# Patient Record
Sex: MALE | Race: WHITE | NOT HISPANIC OR LATINO | Employment: OTHER | URBAN - METROPOLITAN AREA
[De-identification: names, ages, dates, MRNs, and addresses within clinical notes are randomized per-mention and may not be internally consistent; named-entity substitution may affect disease eponyms.]

---

## 2023-06-27 ENCOUNTER — HOSPITAL ENCOUNTER (OUTPATIENT)
Dept: DATA CONVERSION | Facility: HOSPITAL | Age: 53
End: 2023-06-27
Attending: ANESTHESIOLOGY | Admitting: ANESTHESIOLOGY
Payer: COMMERCIAL

## 2023-06-27 DIAGNOSIS — G90.522 COMPLEX REGIONAL PAIN SYNDROME I OF LEFT LOWER LIMB: ICD-10-CM

## 2023-06-27 DIAGNOSIS — M54.16 RADICULOPATHY, LUMBAR REGION: ICD-10-CM

## 2023-10-02 NOTE — OP NOTE
Post Operative Note:     PreOp Diagnosis: CRPS type one of the left lower  extremity   Post-Procedure Diagnosis: CRPS type one of the left  lower extremity   Procedure: 1. LUMBAR SYMPATHETIC NERVE BLOCK     2.  Sedation with midazolam   Surgeon: Marley White MD PHD   Resident/Fellow/Other Assistant: Rahul Vu MD   Estimated Blood Loss (mL): none   Specimen: no   Findings: none     Operative Report Dictated:  Dictation: not applicable - note contains Operative  Report   Operative Report:    53-year-old male with a past medical history of left leg injury in 2014 resulting in CRPS type I.    After informed consent was obtained, the patient was brought to the procedure room and placed in the prone position.  The low back area was prepped and draped in the usual sterile fashion.  Using fluoroscopic guidance, the skin and subcutaneous tissue  overlying needle trajectories were anesthetized with 0.5% ropivacaine.  A 22-gauge Chiba needle was then introduced in an oblique approach until it was flush with the anterior aspect of the vertebral body on the lateral view at L3.  Injection of contrast  revealed appropriate spread.  This was followed by injection of 15 mL of 0.5% ropivacaine in divided doses.  The patient was then transferred to the recovery room in a stable condition.  The patient tolerated procedure well.  There were no apparent complications.     FOLLOW UP:  Postoperatively the patient's skin temperature was recorded.  The left lower extremity was 2 degrees warmer than the right.  Patient notes that he is already experiencing pain relief.  Preoperative pain score was 8 out of 10.  Currently his pain score  6 out of 10.The patient will update us on their response to this procedure, and agrees to continue currently prescribed/recommended therapies.    Attestation:   Note Completion:  I am a: Resident/Fellow   Attending Attestation I was present for the entire procedure         Electronic  Signatures:  Rahul Vu (Fellow))  (Signed 27-Jun-2023 08:43)   Authored: Post Operative Note, Note Completion  Marley White)  (Signed 27-Jun-2023 10:04)   Co-Signer: Post Operative Note, Note Completion      Last Updated: 27-Jun-2023 10:04 by Marley White)

## 2023-10-03 ENCOUNTER — TELEPHONE (OUTPATIENT)
Dept: PAIN MEDICINE | Facility: CLINIC | Age: 53
End: 2023-10-03
Payer: COMMERCIAL

## 2023-10-03 DIAGNOSIS — G90.529 COMPLEX REGIONAL PAIN SYNDROME TYPE 1 OF LOWER EXTREMITY, UNSPECIFIED LATERALITY: Primary | ICD-10-CM

## 2023-10-03 RX ORDER — PREGABALIN 200 MG/1
200 CAPSULE ORAL 2 TIMES DAILY
Qty: 60 CAPSULE | Refills: 1 | Status: SHIPPED | OUTPATIENT
Start: 2023-10-03 | End: 2023-11-13 | Stop reason: ALTCHOICE

## 2023-10-03 NOTE — TELEPHONE ENCOUNTER
Pt requesting pregabalin increase to 200mg. Stated that the 150's aren't as strong anymore. Refill to [facility]. Lov 07/25/23, no fuv. Current csa, needs uds

## 2023-10-17 PROBLEM — G90.522 COMPLEX REGIONAL PAIN SYNDROME TYPE 1 OF LEFT LOWER EXTREMITY: Status: ACTIVE | Noted: 2023-10-17

## 2023-10-17 RX ORDER — ONDANSETRON HYDROCHLORIDE 2 MG/ML
4 INJECTION, SOLUTION INTRAVENOUS ONCE
Status: CANCELLED | OUTPATIENT
Start: 2023-10-18 | End: 2023-10-18

## 2023-10-17 RX ORDER — NITROGLYCERIN 0.4 MG/1
0.4 TABLET SUBLINGUAL ONCE
Status: CANCELLED | OUTPATIENT
Start: 2023-10-18 | End: 2023-10-18

## 2023-10-17 RX ORDER — DIPHENHYDRAMINE HYDROCHLORIDE 50 MG/ML
50 INJECTION INTRAMUSCULAR; INTRAVENOUS AS NEEDED
Status: CANCELLED | OUTPATIENT
Start: 2023-10-18

## 2023-10-17 RX ORDER — LIDOCAINE 50 MG/G
OINTMENT TOPICAL 2 TIMES DAILY
COMMUNITY
Start: 2022-09-27 | End: 2024-01-08 | Stop reason: SINTOL

## 2023-10-17 RX ORDER — GABAPENTIN 300 MG/1
300 CAPSULE ORAL
COMMUNITY
Start: 2022-10-07

## 2023-10-17 RX ORDER — EPINEPHRINE 0.3 MG/.3ML
0.3 INJECTION SUBCUTANEOUS EVERY 5 MIN PRN
Status: CANCELLED | OUTPATIENT
Start: 2023-10-18

## 2023-10-17 RX ORDER — KETOROLAC TROMETHAMINE 30 MG/ML
30 INJECTION, SOLUTION INTRAMUSCULAR; INTRAVENOUS ONCE
Status: CANCELLED | OUTPATIENT
Start: 2023-10-18 | End: 2023-10-18

## 2023-10-17 RX ORDER — METOPROLOL TARTRATE 25 MG/1
25 TABLET, FILM COATED ORAL ONCE
Status: CANCELLED | OUTPATIENT
Start: 2023-10-18 | End: 2023-10-18

## 2023-10-17 RX ORDER — PREGABALIN 150 MG/1
150 CAPSULE ORAL 2 TIMES DAILY
COMMUNITY
Start: 2023-09-05 | End: 2023-10-18 | Stop reason: SDUPTHER

## 2023-10-17 RX ORDER — FAMOTIDINE 10 MG/ML
20 INJECTION INTRAVENOUS ONCE AS NEEDED
Status: CANCELLED | OUTPATIENT
Start: 2023-10-18

## 2023-10-17 RX ORDER — KETAMINE HCL IN NACL, ISO-OSM 1000MG/100
PLASTIC BAG, INJECTION (ML) INTRAVENOUS
Status: CANCELLED | OUTPATIENT
Start: 2023-10-17

## 2023-10-17 RX ORDER — KETAMINE HCL IN NACL, ISO-OSM 100MG/10ML
SYRINGE (ML) INJECTION
Status: CANCELLED | OUTPATIENT
Start: 2023-10-17

## 2023-10-17 RX ORDER — ALBUTEROL SULFATE 0.83 MG/ML
3 SOLUTION RESPIRATORY (INHALATION) AS NEEDED
Status: CANCELLED | OUTPATIENT
Start: 2023-10-18

## 2023-10-18 ENCOUNTER — INFUSION (OUTPATIENT)
Dept: INFUSION THERAPY | Facility: CLINIC | Age: 53
End: 2023-10-18
Payer: COMMERCIAL

## 2023-10-18 VITALS
HEART RATE: 61 BPM | RESPIRATION RATE: 14 BRPM | TEMPERATURE: 97 F | OXYGEN SATURATION: 95 % | SYSTOLIC BLOOD PRESSURE: 103 MMHG | DIASTOLIC BLOOD PRESSURE: 65 MMHG

## 2023-10-18 DIAGNOSIS — G90.522 COMPLEX REGIONAL PAIN SYNDROME TYPE 1 OF LEFT LOWER EXTREMITY: ICD-10-CM

## 2023-10-18 PROCEDURE — 96375 TX/PRO/DX INJ NEW DRUG ADDON: CPT

## 2023-10-18 PROCEDURE — 96368 THER/DIAG CONCURRENT INF: CPT

## 2023-10-18 PROCEDURE — 96365 THER/PROPH/DIAG IV INF INIT: CPT

## 2023-10-18 PROCEDURE — 2500000005 HC RX 250 GENERAL PHARMACY W/O HCPCS: Performed by: NURSE PRACTITIONER

## 2023-10-18 PROCEDURE — 2500000004 HC RX 250 GENERAL PHARMACY W/ HCPCS (ALT 636 FOR OP/ED): Performed by: NURSE PRACTITIONER

## 2023-10-18 PROCEDURE — 2500000004 HC RX 250 GENERAL PHARMACY W/ HCPCS (ALT 636 FOR OP/ED)

## 2023-10-18 RX ORDER — METOPROLOL TARTRATE 25 MG/1
25 TABLET, FILM COATED ORAL ONCE
Status: CANCELLED | OUTPATIENT
Start: 2023-11-01 | End: 2023-11-01

## 2023-10-18 RX ORDER — NITROGLYCERIN 0.4 MG/1
0.4 TABLET SUBLINGUAL ONCE
Status: CANCELLED | OUTPATIENT
Start: 2023-11-01 | End: 2023-11-01

## 2023-10-18 RX ORDER — KETOROLAC TROMETHAMINE 30 MG/ML
30 INJECTION, SOLUTION INTRAMUSCULAR; INTRAVENOUS ONCE
Status: COMPLETED | OUTPATIENT
Start: 2023-10-18 | End: 2023-10-18

## 2023-10-18 RX ORDER — ALBUTEROL SULFATE 0.83 MG/ML
3 SOLUTION RESPIRATORY (INHALATION) AS NEEDED
Status: CANCELLED | OUTPATIENT
Start: 2023-11-01

## 2023-10-18 RX ORDER — EPINEPHRINE 0.3 MG/.3ML
0.3 INJECTION SUBCUTANEOUS EVERY 5 MIN PRN
Status: CANCELLED | OUTPATIENT
Start: 2023-11-01

## 2023-10-18 RX ORDER — ONDANSETRON HYDROCHLORIDE 2 MG/ML
4 INJECTION, SOLUTION INTRAVENOUS ONCE
Status: CANCELLED | OUTPATIENT
Start: 2023-11-01 | End: 2023-11-01

## 2023-10-18 RX ORDER — DIPHENHYDRAMINE HYDROCHLORIDE 50 MG/ML
50 INJECTION INTRAMUSCULAR; INTRAVENOUS AS NEEDED
Status: CANCELLED | OUTPATIENT
Start: 2023-11-01

## 2023-10-18 RX ORDER — FAMOTIDINE 10 MG/ML
20 INJECTION INTRAVENOUS ONCE AS NEEDED
Status: CANCELLED | OUTPATIENT
Start: 2023-11-01

## 2023-10-18 RX ORDER — KETOROLAC TROMETHAMINE 30 MG/ML
INJECTION, SOLUTION INTRAMUSCULAR; INTRAVENOUS
Status: COMPLETED
Start: 2023-10-18 | End: 2023-10-18

## 2023-10-18 RX ORDER — KETOROLAC TROMETHAMINE 30 MG/ML
30 INJECTION, SOLUTION INTRAMUSCULAR; INTRAVENOUS ONCE
Status: CANCELLED | OUTPATIENT
Start: 2023-11-01 | End: 2023-11-01

## 2023-10-18 RX ADMIN — PROPOFOL 100 MG: 10 INJECTION, EMULSION INTRAVENOUS at 09:15

## 2023-10-18 RX ADMIN — KETOROLAC TROMETHAMINE 30 MG: 30 INJECTION INTRAMUSCULAR; INTRAVENOUS at 09:16

## 2023-10-18 RX ADMIN — KETOROLAC TROMETHAMINE 30 MG: 30 INJECTION, SOLUTION INTRAMUSCULAR; INTRAVENOUS at 09:16

## 2023-10-18 RX ADMIN — Medication: at 09:13

## 2023-10-18 ASSESSMENT — PATIENT HEALTH QUESTIONNAIRE - PHQ9
2. FEELING DOWN, DEPRESSED OR HOPELESS: NOT AT ALL
SUM OF ALL RESPONSES TO PHQ9 QUESTIONS 1 AND 2: 0
1. LITTLE INTEREST OR PLEASURE IN DOING THINGS: NOT AT ALL

## 2023-10-18 ASSESSMENT — ENCOUNTER SYMPTOMS
OCCASIONAL FEELINGS OF UNSTEADINESS: 1
DEPRESSION: 0
LOSS OF SENSATION IN FEET: 0

## 2023-10-18 ASSESSMENT — PAIN SCALES - GENERAL
PAINLEVEL_OUTOF10: 6
PAINLEVEL_OUTOF10: 9

## 2023-10-18 ASSESSMENT — COLUMBIA-SUICIDE SEVERITY RATING SCALE - C-SSRS
6. HAVE YOU EVER DONE ANYTHING, STARTED TO DO ANYTHING, OR PREPARED TO DO ANYTHING TO END YOUR LIFE?: NO
1. IN THE PAST MONTH, HAVE YOU WISHED YOU WERE DEAD OR WISHED YOU COULD GO TO SLEEP AND NOT WAKE UP?: NO
2. HAVE YOU ACTUALLY HAD ANY THOUGHTS OF KILLING YOURSELF?: NO

## 2023-10-18 ASSESSMENT — PAIN - FUNCTIONAL ASSESSMENT: PAIN_FUNCTIONAL_ASSESSMENT: 0-10

## 2023-10-18 ASSESSMENT — PAIN DESCRIPTION - DESCRIPTORS: DESCRIPTORS: ACHING

## 2023-10-18 NOTE — PATIENT INSTRUCTIONS
Post infusion teaching provided via handout and verbal instruction. Patient verbalized understanding. VSS, Patient states pain is less. Will assist patient to waiting car via wheelchair.

## 2023-10-18 NOTE — PROGRESS NOTES
S: Patient here for 4th opioid sparing pain infusion. Patient reports zero% reduction in pain after last infusion.    Purpose of pain infusion meds explained along with potential side effects.  Patient verbalized understanding.    B: Pain Issues    A: Patient currently has pain described on flow sheet documentation. Designated  is brother Fadi. Patient last ate solid food >8 hours ago, and had liquid >4 hours ago.    R: Plan; Obtain IV access, do patient risk assessment, and start opioid sparing infusion as ordered. Monitoring for S/S of adverse reactions.    0950: pain infusion completed, patient tolerated well. Patient now rates pain as a 6/10 in lower L leg. Continuing to monitor.

## 2023-10-26 ENCOUNTER — APPOINTMENT (OUTPATIENT)
Dept: PAIN MEDICINE | Facility: CLINIC | Age: 53
End: 2023-10-26
Payer: COMMERCIAL

## 2023-11-01 ENCOUNTER — APPOINTMENT (OUTPATIENT)
Dept: INFUSION THERAPY | Facility: CLINIC | Age: 53
End: 2023-11-01
Payer: COMMERCIAL

## 2023-11-09 ENCOUNTER — APPOINTMENT (OUTPATIENT)
Dept: PAIN MEDICINE | Facility: CLINIC | Age: 53
End: 2023-11-09
Payer: COMMERCIAL

## 2023-11-13 ENCOUNTER — OFFICE VISIT (OUTPATIENT)
Dept: PAIN MEDICINE | Facility: CLINIC | Age: 53
End: 2023-11-13
Payer: COMMERCIAL

## 2023-11-13 VITALS
TEMPERATURE: 97.2 F | RESPIRATION RATE: 16 BRPM | DIASTOLIC BLOOD PRESSURE: 65 MMHG | HEART RATE: 74 BPM | HEIGHT: 77 IN | WEIGHT: 260 LBS | OXYGEN SATURATION: 97 % | SYSTOLIC BLOOD PRESSURE: 93 MMHG | BODY MASS INDEX: 30.7 KG/M2

## 2023-11-13 DIAGNOSIS — G90.522 COMPLEX REGIONAL PAIN SYNDROME TYPE 1 OF LEFT LOWER EXTREMITY: Primary | ICD-10-CM

## 2023-11-13 PROCEDURE — 99213 OFFICE O/P EST LOW 20 MIN: CPT | Performed by: ANESTHESIOLOGY

## 2023-11-13 RX ORDER — PREGABALIN 225 MG/1
225 CAPSULE ORAL 2 TIMES DAILY
Qty: 60 CAPSULE | Refills: 0 | Status: SHIPPED | OUTPATIENT
Start: 2023-11-13 | End: 2023-12-28

## 2023-11-13 SDOH — ECONOMIC STABILITY: HOUSING INSECURITY
IN THE LAST 12 MONTHS, WAS THERE A TIME WHEN YOU DID NOT HAVE A STEADY PLACE TO SLEEP OR SLEPT IN A SHELTER (INCLUDING NOW)?: NO

## 2023-11-13 SDOH — ECONOMIC STABILITY: FOOD INSECURITY: WITHIN THE PAST 12 MONTHS, YOU WORRIED THAT YOUR FOOD WOULD RUN OUT BEFORE YOU GOT MONEY TO BUY MORE.: NEVER TRUE

## 2023-11-13 SDOH — ECONOMIC STABILITY: INCOME INSECURITY: IN THE LAST 12 MONTHS, WAS THERE A TIME WHEN YOU WERE NOT ABLE TO PAY THE MORTGAGE OR RENT ON TIME?: NO

## 2023-11-13 SDOH — ECONOMIC STABILITY: FOOD INSECURITY: WITHIN THE PAST 12 MONTHS, THE FOOD YOU BOUGHT JUST DIDN'T LAST AND YOU DIDN'T HAVE MONEY TO GET MORE.: NEVER TRUE

## 2023-11-13 ASSESSMENT — COLUMBIA-SUICIDE SEVERITY RATING SCALE - C-SSRS
1. IN THE PAST MONTH, HAVE YOU WISHED YOU WERE DEAD OR WISHED YOU COULD GO TO SLEEP AND NOT WAKE UP?: NO
2. HAVE YOU ACTUALLY HAD ANY THOUGHTS OF KILLING YOURSELF?: NO
6. HAVE YOU EVER DONE ANYTHING, STARTED TO DO ANYTHING, OR PREPARED TO DO ANYTHING TO END YOUR LIFE?: NO

## 2023-11-13 ASSESSMENT — PAIN SCALES - GENERAL: PAINLEVEL: 9

## 2023-11-13 ASSESSMENT — PATIENT HEALTH QUESTIONNAIRE - PHQ9
SUM OF ALL RESPONSES TO PHQ9 QUESTIONS 1 AND 2: 0
2. FEELING DOWN, DEPRESSED OR HOPELESS: NOT AT ALL
1. LITTLE INTEREST OR PLEASURE IN DOING THINGS: NOT AT ALL

## 2023-11-13 ASSESSMENT — ENCOUNTER SYMPTOMS
FEVER: 0
SHORTNESS OF BREATH: 0

## 2023-11-13 ASSESSMENT — LIFESTYLE VARIABLES: TOTAL SCORE: 0

## 2023-11-13 NOTE — PROGRESS NOTES
"Chief Complain  Follow-up (CRPS follow up, 9/10 pain level, had 4 infusion gave about 20% relief for a couple hours)       History Of Present Illness  Harry Green is a 53 y.o. male here for left lower extremity pain . The patient rates the pain at 9  on a scale from 0-10.  The patient describes pain as sharp, burning and needles.  The pain is worsened by  putting weight on the left, sleeping  and is alleviated by  elevating the leg .  Since the last visit the pain has stayed the same.  The patient denies any fever, chills, weight loss, bladder/bowel incontinence.       Past Medical History  He has a past medical history of Chronic pain disorder.    Surgical History  He has a past surgical history that includes Hernia repair and Leg Surgery.     Social History  He reports that he has never smoked. He has never used smokeless tobacco. He reports that he does not drink alcohol and does not use drugs.    Family History  Family History   Problem Relation Name Age of Onset    Lymphoma Mother      Cancer Other      Diabetes Other          Allergies  Lidocaine    Review of Systems  Review of Systems   Constitutional:  Negative for fever.   Respiratory:  Negative for shortness of breath.    Cardiovascular:  Negative for chest pain.   Psychiatric/Behavioral:  Negative for suicidal ideas.         Physical Exam  Physical Exam  HENT:      Head: Normocephalic and atraumatic.   Eyes:      Extraocular Movements: Extraocular movements intact.      Pupils: Pupils are equal, round, and reactive to light.   Pulmonary:      Effort: Pulmonary effort is normal.   Musculoskeletal:      Cervical back: Neck supple.   Neurological:      Mental Status: He is alert.   Psychiatric:         Mood and Affect: Mood normal.         Last Recorded Vitals  Blood pressure 93/65, pulse 74, temperature 36.2 °C (97.2 °F), temperature source Temporal, resp. rate 16, height 1.956 m (6' 5\"), weight 118 kg (260 lb), SpO2 97 %.       Assessment/Plan     Harry " Peter is a 53 y.o. male here for follow-up of left knee pain radiating to left shin.  Has been experiencing the symptoms since meniscus repair surgery.  Couple of years ago.  Has previously failed genicular nerve block, he also had lumbar sympathetic block done by Dr. White which did not provide significant relief.  Likely taking Lyrica 200 mg twice daily does help him especially at night helping with sleep, increase the dose to 225 mg twice daily.  He is status post 4 opioid sparing infusion with ketamine lidocaine and propofol which thus far has only provided her with relief lasting for a few hours.  I would refer him to [facility] for consideration of high-dose infusion.      Jose Cummings MD

## 2023-11-15 ENCOUNTER — APPOINTMENT (OUTPATIENT)
Dept: INFUSION THERAPY | Facility: CLINIC | Age: 53
End: 2023-11-15
Payer: COMMERCIAL

## 2023-12-19 ENCOUNTER — TELEPHONE (OUTPATIENT)
Dept: INFUSION THERAPY | Facility: CLINIC | Age: 53
End: 2023-12-19
Payer: COMMERCIAL

## 2023-12-19 NOTE — TELEPHONE ENCOUNTER
Good morning Dr. Cummings,    Do you want a follow up appointment to with Harry Green MRN # [id number] or are you just going to enter the referral to [facility] for high dose Ketamine?    Elaine Ramos

## 2023-12-20 ENCOUNTER — TELEPHONE (OUTPATIENT)
Dept: PAIN MEDICINE | Facility: CLINIC | Age: 53
End: 2023-12-20
Payer: COMMERCIAL

## 2023-12-20 ENCOUNTER — APPOINTMENT (OUTPATIENT)
Dept: INFUSION THERAPY | Facility: CLINIC | Age: 53
End: 2023-12-20
Payer: COMMERCIAL

## 2023-12-26 ENCOUNTER — TELEPHONE (OUTPATIENT)
Dept: PAIN MEDICINE | Facility: CLINIC | Age: 53
End: 2023-12-26
Payer: COMMERCIAL

## 2023-12-28 DIAGNOSIS — G90.522 COMPLEX REGIONAL PAIN SYNDROME TYPE 1 OF LEFT LOWER EXTREMITY: ICD-10-CM

## 2023-12-28 RX ORDER — PREGABALIN 225 MG/1
225 CAPSULE ORAL 2 TIMES DAILY
Qty: 60 CAPSULE | Refills: 2 | Status: SHIPPED | OUTPATIENT
Start: 2023-12-28 | End: 2024-01-08 | Stop reason: SINTOL

## 2023-12-28 NOTE — TELEPHONE ENCOUNTER
Patient was rescheduled due to provider stating he needs to have high Ketamine infusions from the [facility].    Elaine Ramos    12/28/23

## 2024-01-02 ENCOUNTER — TELEPHONE (OUTPATIENT)
Dept: PAIN MEDICINE | Facility: CLINIC | Age: 54
End: 2024-01-02

## 2024-01-03 NOTE — TELEPHONE ENCOUNTER
Spoke with patient he would like to discuss a allergy test and clarification, on appropriate relief from infusion

## 2024-01-05 ENCOUNTER — APPOINTMENT (OUTPATIENT)
Dept: PAIN MEDICINE | Facility: CLINIC | Age: 54
End: 2024-01-05
Payer: COMMERCIAL

## 2024-01-08 ENCOUNTER — OFFICE VISIT (OUTPATIENT)
Dept: PAIN MEDICINE | Facility: CLINIC | Age: 54
End: 2024-01-08
Payer: COMMERCIAL

## 2024-01-08 VITALS
WEIGHT: 260 LBS | HEART RATE: 82 BPM | RESPIRATION RATE: 18 BRPM | BODY MASS INDEX: 30.7 KG/M2 | DIASTOLIC BLOOD PRESSURE: 60 MMHG | SYSTOLIC BLOOD PRESSURE: 114 MMHG | TEMPERATURE: 98.4 F | HEIGHT: 77 IN

## 2024-01-08 DIAGNOSIS — G90.522 COMPLEX REGIONAL PAIN SYNDROME TYPE 1 OF LEFT LOWER EXTREMITY: Primary | ICD-10-CM

## 2024-01-08 PROCEDURE — 99213 OFFICE O/P EST LOW 20 MIN: CPT | Performed by: ANESTHESIOLOGY

## 2024-01-08 RX ORDER — PREGABALIN 75 MG/1
CAPSULE ORAL
Qty: 84 CAPSULE | Refills: 0 | Status: SHIPPED | OUTPATIENT
Start: 2024-01-08 | End: 2024-02-12

## 2024-01-08 ASSESSMENT — PAIN SCALES - GENERAL
PAINLEVEL_OUTOF10: 9
PAINLEVEL: 9

## 2024-01-08 ASSESSMENT — PAIN - FUNCTIONAL ASSESSMENT: PAIN_FUNCTIONAL_ASSESSMENT: 0-10

## 2024-01-08 ASSESSMENT — ENCOUNTER SYMPTOMS
FEVER: 0
SHORTNESS OF BREATH: 0

## 2024-01-08 NOTE — PROGRESS NOTES
"Chief Complain  Follow-up (Would like to discuss pain infusion and some other things. )       History Of Present Illness  Harry Green is a 53 y.o. male here for left lower extremity pain.  The patient rates the pain at 9  on a scale from 0-10.  The patient describes pain as sharp, burning, pins-and-needles.  The pain is worsened by walking and is alleviated by nothing relieves the pain.  Since the last visit the pain has stayed the same.  The patient denies any fever, chills, weight loss, bladder/bowel incontinence.         Past Medical History  He has a past medical history of Chronic pain disorder.    Surgical History  He has a past surgical history that includes Hernia repair and Leg Surgery.     Social History  He reports that he has never smoked. He has never used smokeless tobacco. He reports that he does not drink alcohol and does not use drugs.    Family History  Family History   Problem Relation Name Age of Onset    Lymphoma Mother      Cancer Other      Diabetes Other          Allergies  Lidocaine    Review of Systems  Review of Systems   Constitutional:  Negative for fever.   Respiratory:  Negative for shortness of breath.    Cardiovascular:  Negative for chest pain.   Psychiatric/Behavioral:  Negative for suicidal ideas.         Physical Exam  Physical Exam  HENT:      Head: Normocephalic and atraumatic.   Eyes:      Extraocular Movements: Extraocular movements intact.   Pulmonary:      Effort: Pulmonary effort is normal.   Musculoskeletal:      Cervical back: Neck supple.   Neurological:      Mental Status: He is alert.   Psychiatric:         Mood and Affect: Mood normal.           Last Recorded Vitals  Blood pressure 114/60, pulse 82, temperature 36.9 °C (98.4 °F), resp. rate 18, height 1.956 m (6' 5\"), weight 118 kg (260 lb).       Assessment/Plan     Harry Green is a 53 y.o. male here for follow-up of left knee pain radiating to left lower extremity.  He has been experiencing the symptoms since a " meniscus repair surgery he had couple of years ago.  He has previously failed genicular nerve block, lumbar sympathetic block.  He has been undergoing ketamine infusion therapy without the lidocaine, does have allergies to lidocaine when used topically.  I would order allergy testing to lidocaine see if he does have true allergies or not.  If negative would add lidocaine to the ketamine infusion.  Currently taking 225 mg of Lyrica twice daily with limited benefit and reporting side effects.  I would gradually wean off Lyrica, a weaning prescription was provided to the patient.         Jose Cummings MD

## 2024-06-24 ENCOUNTER — OFFICE VISIT (OUTPATIENT)
Dept: PAIN MEDICINE | Facility: CLINIC | Age: 54
End: 2024-06-24
Payer: COMMERCIAL

## 2024-06-24 VITALS
BODY MASS INDEX: 30.7 KG/M2 | SYSTOLIC BLOOD PRESSURE: 117 MMHG | HEART RATE: 83 BPM | WEIGHT: 260 LBS | RESPIRATION RATE: 18 BRPM | TEMPERATURE: 98.1 F | HEIGHT: 77 IN | OXYGEN SATURATION: 91 % | DIASTOLIC BLOOD PRESSURE: 77 MMHG

## 2024-06-24 DIAGNOSIS — G90.522 COMPLEX REGIONAL PAIN SYNDROME TYPE 1 OF LEFT LOWER EXTREMITY: Primary | ICD-10-CM

## 2024-06-24 PROCEDURE — 99213 OFFICE O/P EST LOW 20 MIN: CPT | Performed by: ANESTHESIOLOGY

## 2024-06-24 SDOH — ECONOMIC STABILITY: FOOD INSECURITY: WITHIN THE PAST 12 MONTHS, THE FOOD YOU BOUGHT JUST DIDN'T LAST AND YOU DIDN'T HAVE MONEY TO GET MORE.: NEVER TRUE

## 2024-06-24 SDOH — ECONOMIC STABILITY: FOOD INSECURITY: WITHIN THE PAST 12 MONTHS, YOU WORRIED THAT YOUR FOOD WOULD RUN OUT BEFORE YOU GOT MONEY TO BUY MORE.: NEVER TRUE

## 2024-06-24 ASSESSMENT — PAIN DESCRIPTION - DESCRIPTORS: DESCRIPTORS: ACHING;BURNING;THROBBING

## 2024-06-24 ASSESSMENT — LIFESTYLE VARIABLES
SKIP TO QUESTIONS 9-10: 1
HOW MANY STANDARD DRINKS CONTAINING ALCOHOL DO YOU HAVE ON A TYPICAL DAY: 1 OR 2
HOW OFTEN DO YOU HAVE SIX OR MORE DRINKS ON ONE OCCASION: NEVER
HOW OFTEN DO YOU HAVE A DRINK CONTAINING ALCOHOL: 2-4 TIMES A MONTH
AUDIT-C TOTAL SCORE: 2

## 2024-06-24 ASSESSMENT — ENCOUNTER SYMPTOMS
LOSS OF SENSATION IN FEET: 1
SHORTNESS OF BREATH: 0
OCCASIONAL FEELINGS OF UNSTEADINESS: 1
DIFFICULTY URINATING: 0
FATIGUE: 0
DEPRESSION: 0

## 2024-06-24 ASSESSMENT — PAIN SCALES - GENERAL
PAINLEVEL: 9
PAINLEVEL_OUTOF10: 9

## 2024-06-24 ASSESSMENT — PAIN - FUNCTIONAL ASSESSMENT: PAIN_FUNCTIONAL_ASSESSMENT: 0-10

## 2024-06-24 NOTE — PROGRESS NOTES
Chief Complain  Follow-up (Went to see allergist about the lidocaine, it is topical./Is getting the ketamine infusions. Ws getting infusions between 2-3 weeks with 20% relief)       History Of Present Illness  Harry Green is a 54 y.o. male here for left knee pain radiating to left lower extremity . The patient rates the pain at 9  on a scale from 0-10.  The patient describes pain as sharp, aching, burning.  The pain is worsened by standing and walking and is alleviated by  leg propped up .  Since the last visit the pain has stayed the same.  The patient denies any fever, chills, weight loss, bladder/bowel incontinence.         Past Medical History  He has a past medical history of Chronic pain disorder.    Surgical History  He has a past surgical history that includes Hernia repair and Leg Surgery.     Social History  He reports that he has never smoked. He has never used smokeless tobacco. He reports that he does not drink alcohol and does not use drugs.    Family History  Family History   Problem Relation Name Age of Onset    Lymphoma Mother      Cancer Other      Diabetes Other          Allergies  Lidocaine    Review of Systems  Review of Systems   Constitutional:  Negative for fatigue.   Respiratory:  Negative for shortness of breath.    Cardiovascular:  Negative for chest pain.   Genitourinary:  Negative for difficulty urinating.   Psychiatric/Behavioral:  Negative for suicidal ideas.         Physical Exam  Physical Exam  HENT:      Head: Normocephalic and atraumatic.   Eyes:      Extraocular Movements: Extraocular movements intact.      Pupils: Pupils are equal, round, and reactive to light.   Pulmonary:      Effort: Pulmonary effort is normal.   Musculoskeletal:      Cervical back: Neck supple.   Neurological:      Mental Status: He is alert.   Psychiatric:         Mood and Affect: Mood normal.           Last Recorded Vitals  Blood pressure 117/77, pulse 83, temperature 36.7 °C (98.1 °F), resp. rate 18,  "height 1.956 m (6' 5\"), weight 118 kg (260 lb), SpO2 91%.       Assessment/Plan     Harry Green is a 54 y.o. male here for follow-up of Left knee pain radiating to left lower extremity has been experiencing the symptoms for more than 3 years now started after meniscus repair surgery.  He has previously has failed genicular nerve block, lumbar sympathetic block.  We have tried opiate sparing infusions without lidocaine with limited benefit.  He has also failed treatment with Lyrica.  Since the last visit he had allergy testing done which according the patient did not cause any systemic allergic reaction.  It was done in [place], his primary care doctor has the records of it.  I would have him sign release so that we can the records for the allergy testing.  If negative would do opiate sparing infusions with propofol and lidocaine and ketamine.        Jose Cummings MD  "

## 2024-06-27 ENCOUNTER — TELEPHONE (OUTPATIENT)
Dept: PAIN MEDICINE | Facility: CLINIC | Age: 54
End: 2024-06-27
Payer: COMMERCIAL

## 2024-06-27 NOTE — TELEPHONE ENCOUNTER
The patients medical history regarding allergy testing you requested has been received and scanned in his chart. Thank you

## 2024-07-08 ENCOUNTER — TELEPHONE (OUTPATIENT)
Dept: PAIN MEDICINE | Facility: CLINIC | Age: 54
End: 2024-07-08
Payer: COMMERCIAL

## 2024-07-08 DIAGNOSIS — G90.522 COMPLEX REGIONAL PAIN SYNDROME TYPE 1 OF LEFT LOWER EXTREMITY: Primary | ICD-10-CM

## 2024-07-08 NOTE — TELEPHONE ENCOUNTER
Pt calling again, wants to know when he can start the infusions    Pt called to see if you have received the results from his allergy test. He was negative for lidocaine.

## 2024-07-11 RX ORDER — DIPHENHYDRAMINE HYDROCHLORIDE 50 MG/ML
50 INJECTION INTRAMUSCULAR; INTRAVENOUS AS NEEDED
OUTPATIENT
Start: 2024-07-18

## 2024-07-11 RX ORDER — KETAMINE HCL IN NACL, ISO-OSM 100MG/10ML
SYRINGE (ML) INJECTION ONCE
OUTPATIENT
Start: 2024-07-18

## 2024-07-11 RX ORDER — FAMOTIDINE 10 MG/ML
20 INJECTION INTRAVENOUS ONCE AS NEEDED
OUTPATIENT
Start: 2024-07-18

## 2024-07-11 RX ORDER — NITROGLYCERIN 0.4 MG/1
0.4 TABLET SUBLINGUAL ONCE
OUTPATIENT
Start: 2024-07-18 | End: 2024-07-18

## 2024-07-11 RX ORDER — EPINEPHRINE 0.3 MG/.3ML
0.3 INJECTION SUBCUTANEOUS EVERY 5 MIN PRN
OUTPATIENT
Start: 2024-07-18

## 2024-07-11 RX ORDER — ALBUTEROL SULFATE 0.83 MG/ML
3 SOLUTION RESPIRATORY (INHALATION) AS NEEDED
OUTPATIENT
Start: 2024-07-18

## 2024-07-11 RX ORDER — ONDANSETRON HYDROCHLORIDE 2 MG/ML
4 INJECTION, SOLUTION INTRAVENOUS ONCE
OUTPATIENT
Start: 2024-07-18 | End: 2024-07-18

## 2024-08-23 ENCOUNTER — TELEPHONE (OUTPATIENT)
Dept: PAIN MEDICINE | Facility: CLINIC | Age: 54
End: 2024-08-23
Payer: COMMERCIAL

## 2024-08-23 NOTE — TELEPHONE ENCOUNTER
Called patient, Have been seeing Dr. Flores, and he is about to retire, and his last visit was 2 weeks ago. Patient wants you to be primary care doctor. He is being treated for CRPS, and get infusion. He states he wants to use you for pain needs. Patient just want Dr. Cummings on file for Workers Comp.

## 2024-09-13 ENCOUNTER — APPOINTMENT (OUTPATIENT)
Dept: INFUSION THERAPY | Facility: CLINIC | Age: 54
End: 2024-09-13
Payer: COMMERCIAL

## 2024-09-17 ENCOUNTER — TELEPHONE (OUTPATIENT)
Dept: PAIN MEDICINE | Facility: CLINIC | Age: 54
End: 2024-09-17

## 2024-09-17 NOTE — TELEPHONE ENCOUNTER
Good Morning.  I received a call from Harry Green inquiring about his visit this Thursday.   He wants to know if the only thing he needs to bring to his appointment is the Notice of Change of Physician Record for Dr. Cummings to sign.  Dr. Flores's office was his [facility], but, his is now switching over to Dr. Cummings, who he has already seen.  The patient was asking about Dr. Cummings's NPI number.  Are we able to provide him with that information?  He said once he has that number, all of his paperwork will be ready to go.  He also mentioned [employer] regarding medications and needing to submit that paperwork at all of his appointments.  Please let me know when you get a chance, and I can follow up with him.  Thanks!

## 2024-09-20 ENCOUNTER — OFFICE VISIT (OUTPATIENT)
Dept: PAIN MEDICINE | Facility: CLINIC | Age: 54
End: 2024-09-20
Payer: COMMERCIAL

## 2024-09-20 VITALS
RESPIRATION RATE: 18 BRPM | HEART RATE: 83 BPM | TEMPERATURE: 97.7 F | BODY MASS INDEX: 30.7 KG/M2 | DIASTOLIC BLOOD PRESSURE: 85 MMHG | SYSTOLIC BLOOD PRESSURE: 110 MMHG | HEIGHT: 77 IN | OXYGEN SATURATION: 98 % | WEIGHT: 260 LBS

## 2024-09-20 DIAGNOSIS — G90.522 COMPLEX REGIONAL PAIN SYNDROME TYPE 1 OF LEFT LOWER EXTREMITY: Primary | ICD-10-CM

## 2024-09-20 PROCEDURE — 99212 OFFICE O/P EST SF 10 MIN: CPT | Performed by: ANESTHESIOLOGY

## 2024-09-20 ASSESSMENT — PAIN SCALES - GENERAL
PAINLEVEL: 9
PAINLEVEL_OUTOF10: 9

## 2024-09-20 ASSESSMENT — ENCOUNTER SYMPTOMS
SHORTNESS OF BREATH: 0
LOSS OF SENSATION IN FEET: 1
DEPRESSION: 1
OCCASIONAL FEELINGS OF UNSTEADINESS: 1
FEVER: 0

## 2024-09-20 ASSESSMENT — PATIENT HEALTH QUESTIONNAIRE - PHQ9
SUM OF ALL RESPONSES TO PHQ9 QUESTIONS 1 AND 2: 0
1. LITTLE INTEREST OR PLEASURE IN DOING THINGS: NOT AT ALL
2. FEELING DOWN, DEPRESSED OR HOPELESS: NOT AT ALL

## 2024-09-20 ASSESSMENT — PAIN - FUNCTIONAL ASSESSMENT: PAIN_FUNCTIONAL_ASSESSMENT: 0-10

## 2024-09-20 ASSESSMENT — COLUMBIA-SUICIDE SEVERITY RATING SCALE - C-SSRS
2. HAVE YOU ACTUALLY HAD ANY THOUGHTS OF KILLING YOURSELF?: NO
1. IN THE PAST MONTH, HAVE YOU WISHED YOU WERE DEAD OR WISHED YOU COULD GO TO SLEEP AND NOT WAKE UP?: NO
6. HAVE YOU EVER DONE ANYTHING, STARTED TO DO ANYTHING, OR PREPARED TO DO ANYTHING TO END YOUR LIFE?: NO

## 2024-09-20 ASSESSMENT — PAIN DESCRIPTION - DESCRIPTORS: DESCRIPTORS: ACHING

## 2024-09-20 NOTE — PROGRESS NOTES
"  Chief Complain  Follow-up (For pain in left knee down. /Currently taking 800 mg of ibprofean./Currently get infusions every 3 weeks.)       History Of Present Illness  Harry Green is a 54 y.o. male here for left knee pain radiating to left lower extremity . The patient rates the pain at 9  on a scale from 0-10.  The patient describes pain as sharp, aching, burning.  The pain is worsened by standing and walking and is alleviated by  leg propped up .  Since the last visit the pain has stayed the same.  The patient denies any fever, chills, weight loss, bladder/bowel incontinence.         Past Medical History  He has a past medical history of Chronic pain disorder.    Surgical History  He has a past surgical history that includes Hernia repair and Leg Surgery.     Social History  He reports that he has never smoked. He has never used smokeless tobacco. He reports that he does not drink alcohol and does not use drugs.    Family History  Family History   Problem Relation Name Age of Onset    Lymphoma Mother      Cancer Other      Diabetes Other          Allergies  Lidocaine    Review of Systems  Review of Systems   Constitutional:  Negative for fever.   Respiratory:  Negative for shortness of breath.    Cardiovascular:  Negative for chest pain.   Skin:  Negative for rash.   Psychiatric/Behavioral:  Negative for suicidal ideas.         Physical Exam  Physical Exam  HENT:      Head: Normocephalic and atraumatic.   Eyes:      Extraocular Movements: Extraocular movements intact.      Pupils: Pupils are equal, round, and reactive to light.   Pulmonary:      Effort: Pulmonary effort is normal.   Musculoskeletal:      Cervical back: Neck supple.   Neurological:      Mental Status: He is alert.   Psychiatric:         Mood and Affect: Mood normal.         Last Recorded Vitals  Blood pressure 110/85, pulse 83, temperature 36.5 °C (97.7 °F), resp. rate 18, height 1.956 m (6' 5\"), weight 118 kg (260 lb), SpO2 98%.     "   Assessment/Plan     Harry Green is a 54 y.o. male here for follow-up of Left knee pain radiating to left lower extremity has been experiencing the symptoms for more than 3 years now started after meniscus repair surgery.  He has previously has failed genicular nerve block, lumbar sympathetic block.  We have tried opiate sparing infusions without lidocaine with limited benefit.  He has also failed treatment with Lyrica.  He has been tested for lidocaine allergy which has been negative.  He has been scheduled for ketamine plus lidocaine infusions still in process of getting approved.  His current Workmen's [employer]. physician on file has been retiring he is here to see if he can get a new physician on file.  We did provide contact information for Dr. Moisés Reyes to establish care and be his physician on file for [facility].  Continue with ketamine infusions as previously scheduled.    Jose Cummings MD

## 2024-09-26 DIAGNOSIS — G90.522 COMPLEX REGIONAL PAIN SYNDROME TYPE 1 OF LEFT LOWER EXTREMITY: Primary | ICD-10-CM

## 2024-10-01 RX ORDER — KETAMINE HCL IN NACL, ISO-OSM 100MG/10ML
SYRINGE (ML) INJECTION ONCE
OUTPATIENT
Start: 2024-10-04

## 2024-10-04 ENCOUNTER — APPOINTMENT (OUTPATIENT)
Dept: INFUSION THERAPY | Facility: CLINIC | Age: 54
End: 2024-10-04
Payer: COMMERCIAL

## 2024-10-04 DIAGNOSIS — G90.522 COMPLEX REGIONAL PAIN SYNDROME TYPE 1 OF LEFT LOWER EXTREMITY: Primary | ICD-10-CM

## 2024-10-23 DIAGNOSIS — G90.522 COMPLEX REGIONAL PAIN SYNDROME TYPE 1 OF LEFT LOWER EXTREMITY: Primary | ICD-10-CM

## 2024-10-23 RX ORDER — KETAMINE HCL IN NACL, ISO-OSM 100MG/10ML
SYRINGE (ML) INJECTION ONCE
Status: CANCELLED | OUTPATIENT
Start: 2024-10-25

## 2024-10-25 ENCOUNTER — INFUSION (OUTPATIENT)
Dept: INFUSION THERAPY | Facility: CLINIC | Age: 54
End: 2024-10-25
Payer: COMMERCIAL

## 2024-10-25 VITALS
OXYGEN SATURATION: 98 % | SYSTOLIC BLOOD PRESSURE: 124 MMHG | RESPIRATION RATE: 19 BRPM | DIASTOLIC BLOOD PRESSURE: 80 MMHG | HEART RATE: 65 BPM | TEMPERATURE: 96.6 F

## 2024-10-25 DIAGNOSIS — G90.522 COMPLEX REGIONAL PAIN SYNDROME TYPE 1 OF LEFT LOWER EXTREMITY: ICD-10-CM

## 2024-10-25 PROCEDURE — 96368 THER/DIAG CONCURRENT INF: CPT | Mod: INF

## 2024-10-25 PROCEDURE — 2500000004 HC RX 250 GENERAL PHARMACY W/ HCPCS (ALT 636 FOR OP/ED): Performed by: NURSE PRACTITIONER

## 2024-10-25 PROCEDURE — 96365 THER/PROPH/DIAG IV INF INIT: CPT | Mod: INF

## 2024-10-25 RX ORDER — DIPHENHYDRAMINE HYDROCHLORIDE 50 MG/ML
50 INJECTION INTRAMUSCULAR; INTRAVENOUS AS NEEDED
OUTPATIENT
Start: 2025-02-01

## 2024-10-25 RX ORDER — NITROGLYCERIN 0.4 MG/1
0.4 TABLET SUBLINGUAL ONCE
OUTPATIENT
Start: 2025-02-01 | End: 2025-02-01

## 2024-10-25 RX ORDER — KETAMINE HCL IN NACL, ISO-OSM 100MG/10ML
SYRINGE (ML) INJECTION ONCE
Status: COMPLETED | OUTPATIENT
Start: 2024-10-25 | End: 2024-10-25

## 2024-10-25 RX ORDER — FAMOTIDINE 10 MG/ML
20 INJECTION INTRAVENOUS ONCE AS NEEDED
OUTPATIENT
Start: 2025-02-01

## 2024-10-25 RX ORDER — EPINEPHRINE 0.3 MG/.3ML
0.3 INJECTION SUBCUTANEOUS EVERY 5 MIN PRN
OUTPATIENT
Start: 2025-02-01

## 2024-10-25 RX ORDER — KETAMINE HCL IN NACL, ISO-OSM 100MG/10ML
SYRINGE (ML) INJECTION ONCE
OUTPATIENT
Start: 2025-02-01

## 2024-10-25 RX ORDER — ALBUTEROL SULFATE 0.83 MG/ML
3 SOLUTION RESPIRATORY (INHALATION) AS NEEDED
OUTPATIENT
Start: 2025-02-01

## 2024-10-25 RX ORDER — ONDANSETRON HYDROCHLORIDE 2 MG/ML
4 INJECTION, SOLUTION INTRAVENOUS ONCE
OUTPATIENT
Start: 2025-02-01 | End: 2025-02-01

## 2024-10-25 ASSESSMENT — PAIN SCALES - GENERAL
PAINLEVEL_OUTOF10: 9
PAINLEVEL_OUTOF10: 7

## 2024-10-25 ASSESSMENT — ENCOUNTER SYMPTOMS
OCCASIONAL FEELINGS OF UNSTEADINESS: 1
LOSS OF SENSATION IN FEET: 1
DEPRESSION: 0

## 2024-10-25 ASSESSMENT — PAIN DESCRIPTION - ORIENTATION: ORIENTATION: LEFT

## 2024-10-25 ASSESSMENT — PAIN DESCRIPTION - LOCATION: LOCATION: KNEE

## 2024-10-25 NOTE — PATIENT INSTRUCTIONS
Today :We administered ketamine 30 mg-lidocaine 300 mg and propofol.     For:   1. Complex regional pain syndrome type 1 of left lower extremity            (Tell all doctors including dentists that you are taking this medication)     Go to the emergency room or call 911 if:  -You have signs of allergic reaction:   -Rash, hives, itching.   -Swollen, blistered, peeling skin.   -Swelling of face, lips, mouth, tongue or throat.   -Tightness of chest, trouble breathing, swallowing or talking     Call your doctor:  - If IV / injection site gets red, warm, swollen, itchy or leaks fluid or pus.     (Leave dressing on your IV site for at least 2 hours and keep area clean and dry  - If you get sick or have symptoms of infection or are not feeling well for any reason.    (Wash your hands often, stay away from people who are sick)  - If you have side effects from your medication that do not go away or are bothersome.     (Refer to the teaching your nurse gave you for side effects to call your doctor about)    - Common side effects may include:  stuffy nose, headache, feeling tired, muscle aches, upset stomach  - Before receiving any vaccines     - Call [facility] at   If:  - You get sick, are on antibiotics, have had a recent vaccine, have surgery or dental work and your doctor wants your visit rescheduled.  - You need to cancel and reschedule your visit for any reason. Call at least 2 days before your visit if you need to cancel.   - Your insurance changes before your next visit.    (We will need to get approval from your new insurance. This can take up to two weeks.)     The [facility] is opened Monday thru Friday. We are closed on weekends and holidays.   Voice mail will take your call if the center is closed. If you leave a message please allow 24 hours for a call back during weekdays. If you leave a message on a weekend/holiday, we will call you back the next business day.              [facility]      Pain Infusion Aftercare Instructions      1. It is normal to feel sedated, tired and low in energy after a pain infusion. DO NOT DRIVE, OPERATE ANY MACHINERY, OR MAKE ANY IMPORTANT DECISIONS FOR AT LEAST 24 HOURS AFTER THE INFUSION.     2. Call the pain center at [phone] with any problems, questions, or concerns.     3. Eat light after the infusion. If you feel queasy or sick to your stomach, laying down with your eyes closed may help. When you resume eating start with something mild like clear liquids, yogurt, applesauce, crackers, etc… Gradually advance to a regular diet.     4. Do not leave your house alone the evening of your pain infusion.     5. No alcohol or sedative medications, such as sleeping pills, for 24 hours after your pain infusion.     6. Resume all other prescribed medications unless directed otherwise by you physician.     7. If you have any medical emergencies, call 911 or go directly to the closest emergency room.

## 2024-10-25 NOTE — PROGRESS NOTES
S: Patient here for 5th opioid sparing pain infusion.    Purpose of pain infusion meds explained along with potential side effects.  Patient verbalized understanding.    B: Pain Issues - L knee    A: Patient currently has pain described on flow sheet documentation. Designated  is patient's brother Fadi [phone].     R: Plan; Obtain IV access, do patient risk assessment, and start opioid sparing infusion as ordered. Monitoring for S/S of adverse reactions. No Toradol until lab results show BUN and creatinine.

## 2024-10-28 ENCOUNTER — LAB (OUTPATIENT)
Dept: LAB | Facility: LAB | Age: 54
End: 2024-10-28
Payer: COMMERCIAL

## 2024-10-28 DIAGNOSIS — G90.522 COMPLEX REGIONAL PAIN SYNDROME TYPE 1 OF LEFT LOWER EXTREMITY: ICD-10-CM

## 2024-10-28 LAB
ANION GAP SERPL CALC-SCNC: 13 MMOL/L (ref 10–20)
BUN SERPL-MCNC: 14 MG/DL (ref 6–23)
CALCIUM SERPL-MCNC: 9.2 MG/DL (ref 8.6–10.3)
CHLORIDE SERPL-SCNC: 102 MMOL/L (ref 98–107)
CO2 SERPL-SCNC: 28 MMOL/L (ref 21–32)
CREAT SERPL-MCNC: 1.25 MG/DL (ref 0.5–1.3)
EGFRCR SERPLBLD CKD-EPI 2021: 68 ML/MIN/1.73M*2
GLUCOSE SERPL-MCNC: 107 MG/DL (ref 74–99)
POTASSIUM SERPL-SCNC: 4.9 MMOL/L (ref 3.5–5.3)
SODIUM SERPL-SCNC: 138 MMOL/L (ref 136–145)

## 2024-10-28 PROCEDURE — 80048 BASIC METABOLIC PNL TOTAL CA: CPT

## 2024-11-14 DIAGNOSIS — G90.522 COMPLEX REGIONAL PAIN SYNDROME TYPE 1 OF LEFT LOWER EXTREMITY: Primary | ICD-10-CM

## 2024-11-15 ENCOUNTER — INFUSION (OUTPATIENT)
Dept: INFUSION THERAPY | Facility: CLINIC | Age: 54
End: 2024-11-15
Payer: COMMERCIAL

## 2024-11-15 VITALS
DIASTOLIC BLOOD PRESSURE: 81 MMHG | OXYGEN SATURATION: 98 % | SYSTOLIC BLOOD PRESSURE: 125 MMHG | HEART RATE: 64 BPM | RESPIRATION RATE: 11 BRPM | TEMPERATURE: 97.3 F

## 2024-11-15 DIAGNOSIS — G90.522 COMPLEX REGIONAL PAIN SYNDROME TYPE 1 OF LEFT LOWER EXTREMITY: ICD-10-CM

## 2024-11-15 PROCEDURE — 2500000004 HC RX 250 GENERAL PHARMACY W/ HCPCS (ALT 636 FOR OP/ED): Performed by: ANESTHESIOLOGY

## 2024-11-15 PROCEDURE — 96375 TX/PRO/DX INJ NEW DRUG ADDON: CPT | Mod: INF

## 2024-11-15 PROCEDURE — 96368 THER/DIAG CONCURRENT INF: CPT | Mod: INF

## 2024-11-15 PROCEDURE — 96365 THER/PROPH/DIAG IV INF INIT: CPT | Mod: INF

## 2024-11-15 PROCEDURE — 2500000004 HC RX 250 GENERAL PHARMACY W/ HCPCS (ALT 636 FOR OP/ED): Performed by: NURSE PRACTITIONER

## 2024-11-15 RX ORDER — KETAMINE HCL IN NACL, ISO-OSM 100MG/10ML
SYRINGE (ML) INJECTION ONCE
Status: COMPLETED | OUTPATIENT
Start: 2024-11-15 | End: 2024-11-15

## 2024-11-15 RX ORDER — ALBUTEROL SULFATE 0.83 MG/ML
3 SOLUTION RESPIRATORY (INHALATION) AS NEEDED
OUTPATIENT
Start: 2024-12-06

## 2024-11-15 RX ORDER — NITROGLYCERIN 0.4 MG/1
0.4 TABLET SUBLINGUAL ONCE
OUTPATIENT
Start: 2024-12-06 | End: 2024-12-06

## 2024-11-15 RX ORDER — FAMOTIDINE 10 MG/ML
20 INJECTION INTRAVENOUS ONCE AS NEEDED
OUTPATIENT
Start: 2024-12-06

## 2024-11-15 RX ORDER — DICLOFENAC SODIUM 75 MG/1
TABLET, DELAYED RELEASE ORAL
COMMUNITY
Start: 2024-10-22

## 2024-11-15 RX ORDER — KETOROLAC TROMETHAMINE 15 MG/ML
15 INJECTION, SOLUTION INTRAMUSCULAR; INTRAVENOUS ONCE
Status: COMPLETED | OUTPATIENT
Start: 2024-11-15 | End: 2024-11-15

## 2024-11-15 RX ORDER — ONDANSETRON HYDROCHLORIDE 2 MG/ML
4 INJECTION, SOLUTION INTRAVENOUS ONCE
OUTPATIENT
Start: 2024-12-06 | End: 2024-12-06

## 2024-11-15 RX ORDER — KETAMINE HCL IN NACL, ISO-OSM 100MG/10ML
SYRINGE (ML) INJECTION ONCE
OUTPATIENT
Start: 2024-12-06

## 2024-11-15 RX ORDER — EPINEPHRINE 0.3 MG/.3ML
0.3 INJECTION SUBCUTANEOUS EVERY 5 MIN PRN
OUTPATIENT
Start: 2024-12-06

## 2024-11-15 RX ORDER — DIPHENHYDRAMINE HYDROCHLORIDE 50 MG/ML
50 INJECTION INTRAMUSCULAR; INTRAVENOUS AS NEEDED
OUTPATIENT
Start: 2024-12-06

## 2024-11-15 ASSESSMENT — PAIN SCALES - GENERAL
PAINLEVEL_OUTOF10: 9
PAINLEVEL_OUTOF10: 9
PAINLEVEL_OUTOF10: 6
PAINLEVEL_OUTOF10: 9

## 2024-11-15 ASSESSMENT — ENCOUNTER SYMPTOMS
DEPRESSION: 0
LOSS OF SENSATION IN FEET: 1
OCCASIONAL FEELINGS OF UNSTEADINESS: 1

## 2024-11-15 NOTE — PROGRESS NOTES
S: Patient here for 6th opioid sparing pain infusion. Patient reports 20% reduction in pain after last infusion that lasted 1 day.    Purpose of pain infusion meds explained along with potential side effects.  Patient verbalized understanding.    B: Pain Issues. Left knee down leg to foot    Is Patient breast feeding: N/A    A: Patient currently has pain described on flow sheet documentation. Designated  is Fadi. Patient last ate solid food >12 hours ago, and had liquid 1 hours ago.    R: Plan; Obtain IV access, do patient risk assessment, and start opioid sparing infusion as ordered. Monitoring for S/S of adverse reactions.    Post infusion teaching provided. Patient verbalized understanding. VSS, Patient states pain is 6/10. Will assist patient to waiting car via wheelchair.

## 2024-11-15 NOTE — PATIENT INSTRUCTIONS
Today :We administered ketamine 30 mg-lidocaine 300 mg and propofol.     For:   1. Complex regional pain syndrome type 1 of left lower extremity       (Tell all doctors including dentists that you are taking this medication)     Go to the emergency room or call 911 if:  -You have signs of allergic reaction:   -Rash, hives, itching.   -Swollen, blistered, peeling skin.   -Swelling of face, lips, mouth, tongue or throat.   -Tightness of chest, trouble breathing, swallowing or talking     Call your doctor:  - If IV / injection site gets red, warm, swollen, itchy or leaks fluid or pus.     (Leave dressing on your IV site for at least 2 hours and keep area clean and dry  - If you get sick or have symptoms of infection or are not feeling well for any reason.    (Wash your hands often, stay away from people who are sick)  - If you have side effects from your medication that do not go away or are bothersome.     (Refer to the teaching your nurse gave you for side effects to call your doctor about)    - Common side effects may include:  stuffy nose, headache, feeling tired, muscle aches, upset stomach  - Before receiving any vaccines     - [facility] at   If:  - You get sick, are on antibiotics, have had a recent vaccine, have surgery or dental work and your doctor wants your visit rescheduled.  - You need to cancel and reschedule your visit for any reason. Call at least 2 days before your visit if you need to cancel.   - Your insurance changes before your next visit.    (We will need to get approval from your new insurance. This can take up to two weeks.)     [facility] is opened Monday thru Friday. We are closed on weekends and holidays.   Voice mail will take your call if the center is closed. If you leave a message please allow 24 hours for a call back during weekdays. If you leave a message on a weekend/holiday, we will call you back the next business day.    A pharmacist is available  Monday - Friday from 8:30AM to 3:30PM to help answer any questions you may have about your prescriptions(s). Please call pharmacy at:    [facility]: [phone]  [facility]: [phone]  [facility]: [phone]              [facility]      Pain Infusion Aftercare Instructions      1. It is normal to feel sedated, tired and low in energy after a pain infusion. DO NOT DRIVE, OPERATE ANY MACHINERY, OR MAKE ANY IMPORTANT DECISIONS FOR AT LEAST 24 HOURS AFTER THE INFUSION.     2. Call the pain center at [phone] with any problems, questions, or concerns.     3. Eat light after the infusion. If you feel queasy or sick to your stomach, laying down with your eyes closed may help. When you resume eating start with something mild like clear liquids, yogurt, applesauce, crackers, etc… Gradually advance to a regular diet.     4. Do not leave your house alone the evening of your pain infusion.     5. No alcohol or sedative medications, such as sleeping pills, for 24 hours after your pain infusion.     6. Resume all other prescribed medications unless directed otherwise by you physician.     7. If you have any medical emergencies, call 911 or go directly to the closest emergency room.

## 2024-11-18 ENCOUNTER — TELEPHONE (OUTPATIENT)
Dept: PAIN MEDICINE | Facility: CLINIC | Age: 54
End: 2024-11-18
Payer: COMMERCIAL

## 2024-11-18 NOTE — TELEPHONE ENCOUNTER
Returned call to patient and had to [facility].  Patient was inquiring about a bill he has received in the amount of $25.63.  I looked at his most current statement and it appears those charges are from some blood work he had done on 10/28/24.  I left the phone numbers for the patient to reach us back if he has any questions.

## 2024-11-22 ENCOUNTER — TELEPHONE (OUTPATIENT)
Dept: INFUSION THERAPY | Facility: CLINIC | Age: 54
End: 2024-11-22
Payer: COMMERCIAL

## 2024-11-22 NOTE — TELEPHONE ENCOUNTER
GREG for patient informing him that I was able to get a retro request approved from [employer] ([facility]) for his BMP blood work that was done in October. Documents are scanned into his chart and he should just follow up with billing and have them submit the claim to [facility] for proper payment.

## 2024-12-06 ENCOUNTER — INFUSION (OUTPATIENT)
Dept: INFUSION THERAPY | Facility: CLINIC | Age: 54
End: 2024-12-06
Payer: COMMERCIAL

## 2024-12-06 VITALS
DIASTOLIC BLOOD PRESSURE: 75 MMHG | HEART RATE: 61 BPM | SYSTOLIC BLOOD PRESSURE: 109 MMHG | OXYGEN SATURATION: 95 % | RESPIRATION RATE: 11 BRPM | TEMPERATURE: 97.7 F

## 2024-12-06 DIAGNOSIS — G90.522 COMPLEX REGIONAL PAIN SYNDROME TYPE 1 OF LEFT LOWER EXTREMITY: ICD-10-CM

## 2024-12-06 PROCEDURE — 96365 THER/PROPH/DIAG IV INF INIT: CPT | Mod: INF

## 2024-12-06 PROCEDURE — 2500000004 HC RX 250 GENERAL PHARMACY W/ HCPCS (ALT 636 FOR OP/ED): Performed by: NURSE PRACTITIONER

## 2024-12-06 PROCEDURE — 96368 THER/DIAG CONCURRENT INF: CPT | Mod: INF

## 2024-12-06 RX ORDER — NITROGLYCERIN 0.4 MG/1
0.4 TABLET SUBLINGUAL ONCE
OUTPATIENT
Start: 2024-12-27 | End: 2024-12-27

## 2024-12-06 RX ORDER — KETAMINE HCL IN NACL, ISO-OSM 100MG/10ML
SYRINGE (ML) INJECTION ONCE
Status: COMPLETED | OUTPATIENT
Start: 2024-12-06 | End: 2024-12-06

## 2024-12-06 RX ORDER — DIPHENHYDRAMINE HYDROCHLORIDE 50 MG/ML
50 INJECTION INTRAMUSCULAR; INTRAVENOUS AS NEEDED
OUTPATIENT
Start: 2024-12-27

## 2024-12-06 RX ORDER — FAMOTIDINE 10 MG/ML
20 INJECTION INTRAVENOUS ONCE AS NEEDED
OUTPATIENT
Start: 2024-12-27

## 2024-12-06 RX ORDER — ALBUTEROL SULFATE 0.83 MG/ML
3 SOLUTION RESPIRATORY (INHALATION) AS NEEDED
OUTPATIENT
Start: 2024-12-27

## 2024-12-06 RX ORDER — KETAMINE HCL IN NACL, ISO-OSM 100MG/10ML
SYRINGE (ML) INJECTION ONCE
OUTPATIENT
Start: 2024-12-27

## 2024-12-06 RX ORDER — EPINEPHRINE 0.3 MG/.3ML
0.3 INJECTION SUBCUTANEOUS EVERY 5 MIN PRN
OUTPATIENT
Start: 2024-12-27

## 2024-12-06 RX ORDER — ONDANSETRON HYDROCHLORIDE 2 MG/ML
4 INJECTION, SOLUTION INTRAVENOUS ONCE
OUTPATIENT
Start: 2024-12-27 | End: 2024-12-27

## 2024-12-06 ASSESSMENT — COLUMBIA-SUICIDE SEVERITY RATING SCALE - C-SSRS
6. HAVE YOU EVER DONE ANYTHING, STARTED TO DO ANYTHING, OR PREPARED TO DO ANYTHING TO END YOUR LIFE?: NO
2. HAVE YOU ACTUALLY HAD ANY THOUGHTS OF KILLING YOURSELF?: NO
1. IN THE PAST MONTH, HAVE YOU WISHED YOU WERE DEAD OR WISHED YOU COULD GO TO SLEEP AND NOT WAKE UP?: NO

## 2024-12-06 ASSESSMENT — ENCOUNTER SYMPTOMS
DEPRESSION: 0
LOSS OF SENSATION IN FEET: 1
OCCASIONAL FEELINGS OF UNSTEADINESS: 1

## 2024-12-06 ASSESSMENT — PAIN SCALES - GENERAL
PAINLEVEL_OUTOF10: 6
PAINLEVEL_OUTOF10: 9

## 2024-12-06 NOTE — PATIENT INSTRUCTIONS
Today :We administered ketamine 30 mg-lidocaine 300 mg and propofol.     For:   1. Complex regional pain syndrome type 1 of left lower extremity         Your next appointment is due in:  SEE AVS        Please read the  Medication Guide that was given to you and reviewed during todays visit.     (Tell all doctors including dentists that you are taking this medication)     Go to the emergency room or call 911 if:  -You have signs of allergic reaction:   -Rash, hives, itching.   -Swollen, blistered, peeling skin.   -Swelling of face, lips, mouth, tongue or throat.   -Tightness of chest, trouble breathing, swallowing or talking     Call your doctor:  - If IV / injection site gets red, warm, swollen, itchy or leaks fluid or pus.     (Leave dressing on your IV site for at least 2 hours and keep area clean and dry  - If you get sick or have symptoms of infection or are not feeling well for any reason.    (Wash your hands often, stay away from people who are sick)  - If you have side effects from your medication that do not go away or are bothersome.     (Refer to the teaching your nurse gave you for side effects to call your doctor about)    - Common side effects may include:  stuffy nose, headache, feeling tired, muscle aches, upset stomach  - Before receiving any vaccines     - Call [facility] at   If:  - You get sick, are on antibiotics, have had a recent vaccine, have surgery or dental work and your doctor wants your visit rescheduled.  - You need to cancel and reschedule your visit for any reason. Call at least 2 days before your visit if you need to cancel.   - Your insurance changes before your next visit.    (We will need to get approval from your new insurance. This can take up to two weeks.)     [facility] is opened Monday thru Friday. We are closed on weekends and holidays.   Voice mail will take your call if the center is closed. If you leave a message please allow 24  hours for a call back during weekdays. If you leave a message on a weekend/holiday, we will call you back the next business day.    A pharmacist is available Monday - Friday from 8:30AM to 3:30PM to help answer any questions you may have about your prescriptions(s). Please call pharmacy at:    [facility]: [phone]  [facility]: [phone]  [facility]: [phone]              [facility] [place]      Pain Infusion Aftercare Instructions      1. It is normal to feel sedated, tired and low in energy after a pain infusion. DO NOT DRIVE, OPERATE ANY MACHINERY, OR MAKE ANY IMPORTANT DECISIONS FOR AT LEAST 24 HOURS AFTER THE INFUSION.     2. Call the pain center at [phone] with any problems, questions, or concerns.     3. Eat light after the infusion. If you feel queasy or sick to your stomach, laying down with your eyes closed may help. When you resume eating start with something mild like clear liquids, yogurt, applesauce, crackers, etc… Gradually advance to a regular diet.     4. Do not leave your house alone the evening of your pain infusion.     5. No alcohol or sedative medications, such as sleeping pills, for 24 hours after your pain infusion.     6. Resume all other prescribed medications unless directed otherwise by you physician.     7. If you have any medical emergencies, call 911 or go directly to the closest emergency room.

## 2024-12-16 ENCOUNTER — APPOINTMENT (OUTPATIENT)
Dept: PAIN MEDICINE | Facility: CLINIC | Age: 54
End: 2024-12-16
Payer: COMMERCIAL

## 2024-12-17 ENCOUNTER — TELEPHONE (OUTPATIENT)
Dept: PAIN MEDICINE | Facility: CLINIC | Age: 54
End: 2024-12-17
Payer: COMMERCIAL

## 2024-12-17 ENCOUNTER — OFFICE VISIT (OUTPATIENT)
Dept: PAIN MEDICINE | Facility: CLINIC | Age: 54
End: 2024-12-17
Payer: COMMERCIAL

## 2024-12-17 VITALS
HEART RATE: 94 BPM | HEIGHT: 77 IN | BODY MASS INDEX: 30.7 KG/M2 | SYSTOLIC BLOOD PRESSURE: 102 MMHG | RESPIRATION RATE: 18 BRPM | OXYGEN SATURATION: 96 % | TEMPERATURE: 96.1 F | WEIGHT: 260 LBS | DIASTOLIC BLOOD PRESSURE: 77 MMHG

## 2024-12-17 DIAGNOSIS — G90.522 COMPLEX REGIONAL PAIN SYNDROME TYPE 1 OF LEFT LOWER EXTREMITY: Primary | ICD-10-CM

## 2024-12-17 PROCEDURE — 99212 OFFICE O/P EST SF 10 MIN: CPT | Performed by: ANESTHESIOLOGY

## 2024-12-17 ASSESSMENT — ENCOUNTER SYMPTOMS
BLOOD IN STOOL: 0
OCCASIONAL FEELINGS OF UNSTEADINESS: 1
FEVER: 0
SHORTNESS OF BREATH: 0
DEPRESSION: 0
LOSS OF SENSATION IN FEET: 1

## 2024-12-17 ASSESSMENT — PATIENT HEALTH QUESTIONNAIRE - PHQ9
1. LITTLE INTEREST OR PLEASURE IN DOING THINGS: NOT AT ALL
2. FEELING DOWN, DEPRESSED OR HOPELESS: NOT AT ALL
SUM OF ALL RESPONSES TO PHQ9 QUESTIONS 1 AND 2: 0

## 2024-12-17 ASSESSMENT — COLUMBIA-SUICIDE SEVERITY RATING SCALE - C-SSRS
2. HAVE YOU ACTUALLY HAD ANY THOUGHTS OF KILLING YOURSELF?: NO
6. HAVE YOU EVER DONE ANYTHING, STARTED TO DO ANYTHING, OR PREPARED TO DO ANYTHING TO END YOUR LIFE?: NO
1. IN THE PAST MONTH, HAVE YOU WISHED YOU WERE DEAD OR WISHED YOU COULD GO TO SLEEP AND NOT WAKE UP?: NO

## 2024-12-17 ASSESSMENT — PAIN SCALES - GENERAL
PAINLEVEL_OUTOF10: 7
PAINLEVEL_OUTOF10: 7

## 2024-12-17 ASSESSMENT — PAIN - FUNCTIONAL ASSESSMENT: PAIN_FUNCTIONAL_ASSESSMENT: 0-10

## 2024-12-17 NOTE — PROGRESS NOTES
"  Chief Complain  Follow-up (For infusions q3 weeks, get about 35% relief that last about 9-10 days. /Pain in left knee down to foot.)       History Of Present Illness  Harry Green is a 54 y.o. male here for left knee pain radiating to left lower extremity . The patient rates the pain at 7  on a scale from 0-10.  The patient describes pain as aching, numbing, burning.  The pain is worsened by standing and walking and is alleviated by  leg propped up .  Since the last visit the pain has stayed the same.  The patient denies any fever, chills, weight loss, bladder/bowel incontinence.         Past Medical History  He has a past medical history of Chronic pain disorder.    Surgical History  He has a past surgical history that includes Hernia repair and Leg Surgery.     Social History  He reports that he has never smoked. He has never used smokeless tobacco. He reports that he does not drink alcohol and does not use drugs.    Family History  Family History   Problem Relation Name Age of Onset    Lymphoma Mother      Cancer Other      Diabetes Other          Allergies  Lidocaine    Review of Systems  Review of Systems   Constitutional:  Negative for fever.   Respiratory:  Negative for shortness of breath.    Cardiovascular:  Negative for chest pain.   Gastrointestinal:  Negative for blood in stool.   Skin:  Negative for rash.   Psychiatric/Behavioral:  Negative for suicidal ideas.         Physical Exam  Physical Exam  HENT:      Head: Normocephalic and atraumatic.   Eyes:      Extraocular Movements: Extraocular movements intact.      Pupils: Pupils are equal, round, and reactive to light.   Pulmonary:      Effort: Pulmonary effort is normal.   Musculoskeletal:      Cervical back: Neck supple.   Neurological:      Mental Status: He is alert.   Psychiatric:         Mood and Affect: Mood normal.         Last Recorded Vitals  Blood pressure 102/77, pulse 94, temperature 35.6 °C (96.1 °F), resp. rate 18, height 1.956 m (6' 5\"), " weight 118 kg (260 lb), SpO2 96%.       Assessment/Plan     Harry Green is a 54 y.o. male here for follow-up of Left knee pain radiating to left lower extremity has been experiencing the symptoms for more than 3 years now started after meniscus repair surgery.  He has previously has failed genicular nerve block, lumbar sympathetic block.   He has also failed treatment with Lyrica.  Since the last visit he has had 3 ketamine, lidocaine and propofol infusions.  Reports reduction in pain from a 9 to 6 out of 10 lasting for around 10 days or so with the last infusion.  He is very happy with the results so far.  I would recommend continuation of the infusions as previously scheduled.  Also discussed about the spinal cord stimulator.  Would continue with the ketamine infusions for now consider SCS in future if needed.      Jose Cummings MD

## 2024-12-18 ENCOUNTER — TELEPHONE (OUTPATIENT)
Dept: PAIN MEDICINE | Facility: CLINIC | Age: 54
End: 2024-12-18
Payer: COMMERCIAL

## 2024-12-18 NOTE — TELEPHONE ENCOUNTER
LVM indicating that Dr. Moisés Reyes needs to submit all C-9's now as he is the POR for the patient. I received a fax this morning detailing that information. If he has any questions he can call me, otherwise Harry is to follow-up with Dr. Reyes's office for the requests.

## 2024-12-27 ENCOUNTER — APPOINTMENT (OUTPATIENT)
Dept: INFUSION THERAPY | Facility: CLINIC | Age: 54
End: 2024-12-27
Payer: COMMERCIAL

## 2025-01-14 DIAGNOSIS — G90.522 COMPLEX REGIONAL PAIN SYNDROME TYPE 1 OF LEFT LOWER EXTREMITY: Primary | ICD-10-CM

## 2025-01-14 RX ORDER — KETAMINE HCL IN NACL, ISO-OSM 100MG/10ML
SYRINGE (ML) INJECTION ONCE
OUTPATIENT
Start: 2025-01-17

## 2025-01-17 ENCOUNTER — APPOINTMENT (OUTPATIENT)
Dept: INFUSION THERAPY | Facility: CLINIC | Age: 55
End: 2025-01-17
Payer: COMMERCIAL

## 2025-01-24 ENCOUNTER — APPOINTMENT (OUTPATIENT)
Dept: INFUSION THERAPY | Facility: CLINIC | Age: 55
End: 2025-01-24
Payer: COMMERCIAL

## 2025-02-12 ENCOUNTER — APPOINTMENT (OUTPATIENT)
Dept: INFUSION THERAPY | Facility: CLINIC | Age: 55
End: 2025-02-12
Payer: COMMERCIAL

## 2025-02-21 ENCOUNTER — APPOINTMENT (OUTPATIENT)
Dept: INFUSION THERAPY | Facility: CLINIC | Age: 55
End: 2025-02-21
Payer: COMMERCIAL

## 2025-03-14 ENCOUNTER — APPOINTMENT (OUTPATIENT)
Dept: INFUSION THERAPY | Facility: CLINIC | Age: 55
End: 2025-03-14
Payer: COMMERCIAL

## 2025-03-31 DIAGNOSIS — G90.522 COMPLEX REGIONAL PAIN SYNDROME TYPE 1 OF LEFT LOWER EXTREMITY: Primary | ICD-10-CM

## 2025-03-31 RX ORDER — KETAMINE HCL IN NACL, ISO-OSM 100MG/10ML
SYRINGE (ML) INJECTION ONCE
OUTPATIENT
Start: 2025-04-04

## 2025-04-04 ENCOUNTER — APPOINTMENT (OUTPATIENT)
Dept: INFUSION THERAPY | Facility: CLINIC | Age: 55
End: 2025-04-04
Payer: COMMERCIAL

## 2025-04-25 ENCOUNTER — APPOINTMENT (OUTPATIENT)
Dept: INFUSION THERAPY | Facility: CLINIC | Age: 55
End: 2025-04-25
Payer: COMMERCIAL

## 2025-04-29 DIAGNOSIS — G90.522 COMPLEX REGIONAL PAIN SYNDROME TYPE 1 OF LEFT LOWER EXTREMITY: Primary | ICD-10-CM

## 2025-05-02 ENCOUNTER — INFUSION (OUTPATIENT)
Dept: INFUSION THERAPY | Facility: CLINIC | Age: 55
End: 2025-05-02
Payer: COMMERCIAL

## 2025-05-02 VITALS
OXYGEN SATURATION: 96 % | RESPIRATION RATE: 16 BRPM | DIASTOLIC BLOOD PRESSURE: 80 MMHG | HEART RATE: 64 BPM | SYSTOLIC BLOOD PRESSURE: 128 MMHG | TEMPERATURE: 97.5 F

## 2025-05-02 DIAGNOSIS — G90.522 COMPLEX REGIONAL PAIN SYNDROME TYPE 1 OF LEFT LOWER EXTREMITY: ICD-10-CM

## 2025-05-02 PROCEDURE — 96365 THER/PROPH/DIAG IV INF INIT: CPT | Mod: INF

## 2025-05-02 PROCEDURE — 2500000004 HC RX 250 GENERAL PHARMACY W/ HCPCS (ALT 636 FOR OP/ED): Performed by: NURSE PRACTITIONER

## 2025-05-02 PROCEDURE — 96368 THER/DIAG CONCURRENT INF: CPT | Mod: INF

## 2025-05-02 RX ORDER — FAMOTIDINE 10 MG/ML
20 INJECTION, SOLUTION INTRAVENOUS ONCE AS NEEDED
OUTPATIENT
Start: 2025-05-23

## 2025-05-02 RX ORDER — ALBUTEROL SULFATE 0.83 MG/ML
3 SOLUTION RESPIRATORY (INHALATION) AS NEEDED
OUTPATIENT
Start: 2025-05-23

## 2025-05-02 RX ORDER — DIPHENHYDRAMINE HYDROCHLORIDE 50 MG/ML
50 INJECTION, SOLUTION INTRAMUSCULAR; INTRAVENOUS AS NEEDED
OUTPATIENT
Start: 2025-05-23

## 2025-05-02 RX ORDER — ONDANSETRON HYDROCHLORIDE 2 MG/ML
4 INJECTION, SOLUTION INTRAVENOUS ONCE
OUTPATIENT
Start: 2025-05-23 | End: 2025-05-23

## 2025-05-02 RX ORDER — NITROGLYCERIN 0.4 MG/1
0.4 TABLET SUBLINGUAL ONCE
OUTPATIENT
Start: 2025-05-23 | End: 2025-05-23

## 2025-05-02 RX ORDER — EPINEPHRINE 0.3 MG/.3ML
0.3 INJECTION SUBCUTANEOUS EVERY 5 MIN PRN
OUTPATIENT
Start: 2025-05-23

## 2025-05-02 RX ADMIN — Medication: at 08:55

## 2025-05-02 RX ADMIN — PROPOFOL 100 MG: 10 INJECTION, EMULSION INTRAVENOUS at 08:55

## 2025-05-02 ASSESSMENT — ENCOUNTER SYMPTOMS
LOSS OF SENSATION IN FEET: 1
DEPRESSION: 0
OCCASIONAL FEELINGS OF UNSTEADINESS: 1

## 2025-05-02 ASSESSMENT — PAIN - FUNCTIONAL ASSESSMENT
PAIN_FUNCTIONAL_ASSESSMENT: 0-10
PAIN_FUNCTIONAL_ASSESSMENT: 0-10

## 2025-05-02 ASSESSMENT — PAIN SCALES - GENERAL
PAINLEVEL_OUTOF10: 6
PAINLEVEL_OUTOF10: 9

## 2025-05-02 NOTE — PROGRESS NOTES
S: Patient here for 8 th opioid sparing pain infusion. Patient reports 30% reduction in pain after last infusion that lasted 10 days.    Purpose of pain infusion meds explained along with potential side effects.  Patient verbalized understanding.    B: Pain Issues. Is Patient breast feeding: N/A      Is Patient receiving any other form Ketamine from any other facility/ outside clinic ?: NO  A: Patient currently has pain described on flow sheet documentation. Designated  is Brother Fadi [phone]. Patient last ate solid food >6 hours ago, and had liquid >1 hours ago.    R: Plan; Obtain IV access, do patient risk assessment, and start opioid sparing infusion as ordered. Monitoring for S/S of adverse reactions.    0920: Patient is restful as of this time, denies distress, tolerating pain infusion well.  Trigeminy noted prior start of pain infusion, Trigeminy resolved as of this time. Continuing to monitor.    0935: Post infusion teaching provided. Patient verbalized understanding. VSS, Patient states pain is less , now rated 6/10. Will assist patient to waiting car via wheelchair.    Patient also instructed to make his PCP aware of his PVC's/Trigeminy.

## 2025-05-02 NOTE — PATIENT INSTRUCTIONS
Today :We administered (ketamine 30 mg, lidocaine (Xylocaine) 300 mg in sodium chloride 0.9% 518 mL IV) and propofol (Diprivan) 100 mg in dextrose 5% 25 mL IV.     For:   1. Complex regional pain syndrome type 1 of left lower extremity         Your next appointment is due in:  See AVS        Please read the  Medication Guide that was given to you and reviewed during todays visit.     (Tell all doctors including dentists that you are taking this medication)     Go to the emergency room or call 911 if:  -You have signs of allergic reaction:   -Rash, hives, itching.   -Swollen, blistered, peeling skin.   -Swelling of face, lips, mouth, tongue or throat.   -Tightness of chest, trouble breathing, swallowing or talking     Call your doctor:  - If IV / injection site gets red, warm, swollen, itchy or leaks fluid or pus.     (Leave dressing on your IV site for at least 2 hours and keep area clean and dry  - If you get sick or have symptoms of infection or are not feeling well for any reason.    (Wash your hands often, stay away from people who are sick)  - If you have side effects from your medication that do not go away or are bothersome.     (Refer to the teaching your nurse gave you for side effects to call your doctor about)    - Common side effects may include:  stuffy nose, headache, feeling tired, muscle aches, upset stomach  - Before receiving any vaccines     - Call [facility] at   If:  - You get sick, are on antibiotics, have had a recent vaccine, have surgery or dental work and your doctor wants your visit rescheduled.  - You need to cancel and reschedule your visit for any reason. Call at least 2 days before your visit if you need to cancel.   - Your insurance changes before your next visit.    (We will need to get approval from your new insurance. This can take up to two weeks.)     [facility] is opened Monday thru Friday. We are closed on weekends and holidays.   Voice  mail will take your call if the center is closed. If you leave a message please allow 24 hours for a call back during weekdays. If you leave a message on a weekend/holiday, we will call you back the next business day.    A pharmacist is available Monday - Friday from 8:30AM to 3:30PM to help answer any questions you may have about your prescriptions(s). Please call pharmacy at:    [facility]: [phone]  [facility]: [phone]  [facility]: [phone]              [facility]      Pain Infusion Aftercare Instructions      1. It is normal to feel sedated, tired and low in energy after a pain infusion. DO NOT DRIVE, OPERATE ANY MACHINERY, OR MAKE ANY IMPORTANT DECISIONS FOR AT LEAST 24 HOURS AFTER THE INFUSION.     2. Call the pain center at [phone] with any problems, questions, or concerns.     3. Eat light after the infusion. If you feel queasy or sick to your stomach, laying down with your eyes closed may help. When you resume eating start with something mild like clear liquids, yogurt, applesauce, crackers, etc… Gradually advance to a regular diet.     4. Do not leave your house alone the evening of your pain infusion.     5. No alcohol or sedative medications, such as sleeping pills, for 24 hours after your pain infusion.     6. Resume all other prescribed medications unless directed otherwise by you physician.     7. If you have any medical emergencies, call 911 or go directly to the closest emergency room.

## 2025-05-20 ENCOUNTER — TELEPHONE (OUTPATIENT)
Dept: PAIN MEDICINE | Facility: CLINIC | Age: 55
End: 2025-05-20
Payer: COMMERCIAL

## 2025-05-20 NOTE — TELEPHONE ENCOUNTER
Perla called and stated she needs a retro c9 for 05/02/25 infusion. Her call back is [phone]

## 2025-05-22 DIAGNOSIS — G90.522 COMPLEX REGIONAL PAIN SYNDROME TYPE 1 OF LEFT LOWER EXTREMITY: Primary | ICD-10-CM

## 2025-05-30 ENCOUNTER — INFUSION (OUTPATIENT)
Dept: INFUSION THERAPY | Facility: CLINIC | Age: 55
End: 2025-05-30
Payer: COMMERCIAL

## 2025-05-30 VITALS
RESPIRATION RATE: 16 BRPM | TEMPERATURE: 97.3 F | HEART RATE: 76 BPM | OXYGEN SATURATION: 97 % | DIASTOLIC BLOOD PRESSURE: 84 MMHG | SYSTOLIC BLOOD PRESSURE: 128 MMHG

## 2025-05-30 DIAGNOSIS — G90.522 COMPLEX REGIONAL PAIN SYNDROME TYPE 1 OF LEFT LOWER EXTREMITY: ICD-10-CM

## 2025-05-30 PROCEDURE — 96365 THER/PROPH/DIAG IV INF INIT: CPT | Mod: INF

## 2025-05-30 PROCEDURE — 2500000004 HC RX 250 GENERAL PHARMACY W/ HCPCS (ALT 636 FOR OP/ED): Performed by: NURSE PRACTITIONER

## 2025-05-30 PROCEDURE — 96368 THER/DIAG CONCURRENT INF: CPT | Mod: INF

## 2025-05-30 RX ORDER — FAMOTIDINE 10 MG/ML
20 INJECTION, SOLUTION INTRAVENOUS ONCE AS NEEDED
OUTPATIENT
Start: 2025-06-20

## 2025-05-30 RX ORDER — EPINEPHRINE 0.3 MG/.3ML
0.3 INJECTION SUBCUTANEOUS EVERY 5 MIN PRN
OUTPATIENT
Start: 2025-06-20

## 2025-05-30 RX ORDER — ONDANSETRON HYDROCHLORIDE 2 MG/ML
4 INJECTION, SOLUTION INTRAVENOUS ONCE
OUTPATIENT
Start: 2025-06-20 | End: 2025-06-20

## 2025-05-30 RX ORDER — ALBUTEROL SULFATE 0.83 MG/ML
3 SOLUTION RESPIRATORY (INHALATION) AS NEEDED
OUTPATIENT
Start: 2025-06-20

## 2025-05-30 RX ORDER — NITROGLYCERIN 0.4 MG/1
0.4 TABLET SUBLINGUAL ONCE
OUTPATIENT
Start: 2025-06-20 | End: 2025-06-20

## 2025-05-30 RX ORDER — DIPHENHYDRAMINE HYDROCHLORIDE 50 MG/ML
50 INJECTION, SOLUTION INTRAMUSCULAR; INTRAVENOUS AS NEEDED
OUTPATIENT
Start: 2025-06-20

## 2025-05-30 RX ADMIN — Medication: at 08:37

## 2025-05-30 RX ADMIN — PROPOFOL 100 MG: 10 INJECTION, EMULSION INTRAVENOUS at 08:39

## 2025-05-30 ASSESSMENT — PAIN - FUNCTIONAL ASSESSMENT
PAIN_FUNCTIONAL_ASSESSMENT: 0-10

## 2025-05-30 ASSESSMENT — PAIN SCALES - GENERAL
PAINLEVEL_OUTOF10: 9
PAINLEVEL_OUTOF10: 6
PAINLEVEL_OUTOF10: 6

## 2025-05-30 ASSESSMENT — ENCOUNTER SYMPTOMS
DEPRESSION: 0
LOSS OF SENSATION IN FEET: 1
OCCASIONAL FEELINGS OF UNSTEADINESS: 1

## 2025-05-30 NOTE — PROGRESS NOTES
Post infusion teaching provided. Patient verbalized understanding. VSS, Patient states pain is 6/10 same locations.   Patient tolerated pain infusion well without difficulty.

## 2025-05-30 NOTE — PATIENT INSTRUCTIONS
[facility]      Pain Infusion Aftercare Instructions      1. It is normal to feel sedated, tired and low in energy after a pain infusion. DO NOT DRIVE, OPERATE ANY MACHINERY, OR MAKE ANY IMPORTANT DECISIONS FOR AT LEAST 24 HOURS AFTER THE INFUSION.     2. Call the pain center at [phone] with any problems, questions, or concerns.     3. Eat light after the infusion. If you feel queasy or sick to your stomach, laying down with your eyes closed may help. When you resume eating start with something mild like clear liquids, yogurt, applesauce, crackers, etc… Gradually advance to a regular diet.     4. Do not leave your house alone the evening of your pain infusion.     5. No alcohol or sedative medications, such as sleeping pills, for 24 hours after your pain infusion.     6. Resume all other prescribed medications unless directed otherwise by you physician.     7. If you have any medical emergencies, call 911 or go directly to the closest emergency room.Today :We administered (ketamine 30 mg, lidocaine (Xylocaine) 300 mg in sodium chloride 0.9% 518 mL IV) and propofol (Diprivan) 100 mg in dextrose 5% 25 mL IV.     For:   1. Complex regional pain syndrome type 1 of left lower extremity         Your next appointment is due in:  6/20/25 pain infusion        Please read the  Medication Guide that was given to you and reviewed during todays visit.     (Tell all doctors including dentists that you are taking this medication)     Go to the emergency room or call 911 if:  -You have signs of allergic reaction:   -Rash, hives, itching.   -Swollen, blistered, peeling skin.   -Swelling of face, lips, mouth, tongue or throat.   -Tightness of chest, trouble breathing, swallowing or talking     Call your doctor:  - If IV / injection site gets red, warm, swollen, itchy or leaks fluid or pus.     (Leave dressing on your IV site for at least 2 hours and keep area clean and dry  - If you get sick or have  symptoms of infection or are not feeling well for any reason.    (Wash your hands often, stay away from people who are sick)  - If you have side effects from your medication that do not go away or are bothersome.     (Refer to the teaching your nurse gave you for side effects to call your doctor about)    - Common side effects may include:  stuffy nose, headache, feeling tired, muscle aches, upset stomach  - Before receiving any vaccines     - Call the [facility] at   If:  - You get sick, are on antibiotics, have had a recent vaccine, have surgery or dental work and your doctor wants your visit rescheduled.  - You need to cancel and reschedule your visit for any reason. Call at least 2 days before your visit if you need to cancel.   - Your insurance changes before your next visit.    (We will need to get approval from your new insurance. This can take up to two weeks.)     [facility] is opened Monday thru Friday. We are closed on weekends and holidays.   Voice mail will take your call if the center is closed. If you leave a message please allow 24 hours for a call back during weekdays. If you leave a message on a weekend/holiday, we will call you back the next business day.    A pharmacist is available Monday - Friday from 8:30AM to 3:30PM to help answer any questions you may have about your prescriptions(s). Please call pharmacy at:    [facility]: [phone]  [facility]: [phone]  [facility]: [phone]      Patient Instructions  IV Infusion  [facility]      1. A responsible adult must stay with you during your infusion and drive you home. If you do not have a responsible adult with transportation home, your appointment will be rescheduled. Patients must still have a responsible adult if they use [employer]ides[employer]are, Uber, or Lyft. Uber, Rides[employer], or Lyft drivers do not qualify.   2. On the day of your infusion we ask that you  please drink clear liquids until your appointment.  You should NOT eat food 4 hours before your infusion.    3. Take all medications as prescribed before your infusion. Do not withhold blood pressure medication.   4. Patients who use a CPAP or BIPAP should bring it to the infusion appointment.   5. Children under 16 will not be permitted in the infusion clinic. Please do not bring young children or pets. For patients who must bring a service animal, paperwork will be required. NO EXCEPTIONS.  6.  All Women will be required to take a pregnancy test prior to the infusion unless they are above 55 years of age or have had a hysterectomy.   7. Patients who cancel their infusion should call the Infusion line at [phone] as soon as possible. We would appreciate a 48-hour notice. Please be on time for the appt. Patients who are more than 15 mins late will have to cancel and reschedule. Infusion appt times are minimal. Patients who do not show, cancel, or reschedule an appointment may have a long wait until we can get them back on the schedule.   8. We make every effort to schedule your infusions based on your physician's recommendations. However, factors will affect our infusion schedule and availability. We appreciate your understanding.  9. Appointments will only be scheduled for two appointments in the future.   10. No eating, drinking, or chewing gum during the infusion.   11. If you miss or cancel your infusion appointment it is YOUR responsibility to call us and reschedule.  Please call [phone] or [phone] to reschedule or cancel appointment

## 2025-05-30 NOTE — PROGRESS NOTES
S: Patient here for #9 opioid sparing pain infusion. Patient reports 25% reduction in pain after last infusion that lasted 5 days.    Purpose of pain infusion meds explained along with potential side effects.  Patient verbalized understanding.    B: Pain Issues. Is Patient breast feeding: ?      Is Patient receiving any other form Ketamine from any other facility/ outside clinic ?: no  A: Patient currently has pain described on flow sheet documentation. Designated  is Fadi (brother). Patient last ate solid food >10 hours ago, and had liquid >10 hours ago.    R: Plan; Obtain IV access, do patient risk assessment, and start opioid sparing infusion as ordered. Monitoring for S/S of adverse reactions.

## 2025-06-13 DIAGNOSIS — G90.522 COMPLEX REGIONAL PAIN SYNDROME TYPE 1 OF LEFT LOWER EXTREMITY: Primary | ICD-10-CM

## 2025-06-20 ENCOUNTER — INFUSION (OUTPATIENT)
Dept: INFUSION THERAPY | Facility: CLINIC | Age: 55
End: 2025-06-20
Payer: COMMERCIAL

## 2025-06-20 VITALS
OXYGEN SATURATION: 99 % | TEMPERATURE: 98.2 F | DIASTOLIC BLOOD PRESSURE: 79 MMHG | SYSTOLIC BLOOD PRESSURE: 135 MMHG | HEART RATE: 88 BPM | RESPIRATION RATE: 18 BRPM

## 2025-06-20 DIAGNOSIS — G90.522 COMPLEX REGIONAL PAIN SYNDROME TYPE 1 OF LEFT LOWER EXTREMITY: ICD-10-CM

## 2025-06-20 PROCEDURE — 2500000004 HC RX 250 GENERAL PHARMACY W/ HCPCS (ALT 636 FOR OP/ED): Performed by: NURSE PRACTITIONER

## 2025-06-20 PROCEDURE — 96368 THER/DIAG CONCURRENT INF: CPT | Mod: INF

## 2025-06-20 PROCEDURE — 96365 THER/PROPH/DIAG IV INF INIT: CPT | Mod: INF

## 2025-06-20 RX ORDER — ALBUTEROL SULFATE 0.83 MG/ML
3 SOLUTION RESPIRATORY (INHALATION) AS NEEDED
OUTPATIENT
Start: 2025-07-11

## 2025-06-20 RX ORDER — ONDANSETRON HYDROCHLORIDE 2 MG/ML
4 INJECTION, SOLUTION INTRAVENOUS ONCE
OUTPATIENT
Start: 2025-07-11 | End: 2025-07-11

## 2025-06-20 RX ORDER — FAMOTIDINE 10 MG/ML
20 INJECTION, SOLUTION INTRAVENOUS ONCE AS NEEDED
OUTPATIENT
Start: 2025-07-11

## 2025-06-20 RX ORDER — DIPHENHYDRAMINE HYDROCHLORIDE 50 MG/ML
50 INJECTION, SOLUTION INTRAMUSCULAR; INTRAVENOUS AS NEEDED
OUTPATIENT
Start: 2025-07-11

## 2025-06-20 RX ORDER — NITROGLYCERIN 0.4 MG/1
0.4 TABLET SUBLINGUAL ONCE
OUTPATIENT
Start: 2025-07-11 | End: 2025-07-11

## 2025-06-20 RX ORDER — EPINEPHRINE 0.3 MG/.3ML
0.3 INJECTION SUBCUTANEOUS EVERY 5 MIN PRN
OUTPATIENT
Start: 2025-07-11

## 2025-06-20 RX ADMIN — Medication: at 09:15

## 2025-06-20 RX ADMIN — PROPOFOL 100 MG: 10 INJECTION, EMULSION INTRAVENOUS at 09:15

## 2025-06-20 ASSESSMENT — PAIN SCALES - GENERAL
PAINLEVEL_OUTOF10: 0 - NO PAIN
PAINLEVEL_OUTOF10: 9

## 2025-06-20 ASSESSMENT — PAIN - FUNCTIONAL ASSESSMENT
PAIN_FUNCTIONAL_ASSESSMENT: 0-10
PAIN_FUNCTIONAL_ASSESSMENT: 0-10

## 2025-06-20 ASSESSMENT — ENCOUNTER SYMPTOMS
OCCASIONAL FEELINGS OF UNSTEADINESS: 1
LOSS OF SENSATION IN FEET: 1
DEPRESSION: 0

## 2025-06-20 NOTE — PATIENT INSTRUCTIONS
Today :We administered (ketamine 30 mg, lidocaine (Xylocaine) 300 mg in sodium chloride 0.9% 518 mL IV) and propofol (Diprivan) 100 mg in dextrose 5% 25 mL IV.     For:   1. Complex regional pain syndrome type 1 of left lower extremity         Your next appointment is due in:  2 weeks        Please read the  Medication Guide that was given to you and reviewed during todays visit.     (Tell all doctors including dentists that you are taking this medication)     Go to the emergency room or call 911 if:  -You have signs of allergic reaction:   -Rash, hives, itching.   -Swollen, blistered, peeling skin.   -Swelling of face, lips, mouth, tongue or throat.   -Tightness of chest, trouble breathing, swallowing or talking     Call your doctor:  - If IV / injection site gets red, warm, swollen, itchy or leaks fluid or pus.     (Leave dressing on your IV site for at least 2 hours and keep area clean and dry  - If you get sick or have symptoms of infection or are not feeling well for any reason.    (Wash your hands often, stay away from people who are sick)  - If you have side effects from your medication that do not go away or are bothersome.     (Refer to the teaching your nurse gave you for side effects to call your doctor about)    - Common side effects may include:  stuffy nose, headache, feeling tired, muscle aches, upset stomach  - Before receiving any vaccines     - Call [facility] at   If:  - You get sick, are on antibiotics, have had a recent vaccine, have surgery or dental work and your doctor wants your visit rescheduled.  - You need to cancel and reschedule your visit for any reason. Call at least 2 days before your visit if you need to cancel.   - Your insurance changes before your next visit.    (We will need to get approval from your new insurance. This can take up to two weeks.)     The [facility] is opened Monday thru Friday. We are closed on weekends and holidays.   Voice  mail will take your call if the center is closed. If you leave a message please allow 24 hours for a call back during weekdays. If you leave a message on a weekend/holiday, we will call you back the next business day.    A pharmacist is available Monday - Friday from 8:30AM to 3:30PM to help answer any questions you may have about your prescriptions(s). Please call pharmacy at:    [facility]: [phone]  [facility]: [phone]  [facility]: [phone]              [facility]      Pain Infusion Aftercare Instructions      1. It is normal to feel sedated, tired and low in energy after a pain infusion. DO NOT DRIVE, OPERATE ANY MACHINERY, OR MAKE ANY IMPORTANT DECISIONS FOR AT LEAST 24 HOURS AFTER THE INFUSION.     2. Call the pain center at [phone] with any problems, questions, or concerns.     3. Eat light after the infusion. If you feel queasy or sick to your stomach, laying down with your eyes closed may help. When you resume eating start with something mild like clear liquids, yogurt, applesauce, crackers, etc… Gradually advance to a regular diet.     4. Do not leave your house alone the evening of your pain infusion.     5. No alcohol or sedative medications, such as sleeping pills, for 24 hours after your pain infusion.     6. Resume all other prescribed medications unless directed otherwise by you physician.     7. If you have any medical emergencies, call 911 or go directly to the closest emergency room.

## 2025-06-20 NOTE — PROGRESS NOTES
S: Patient here for #10 opioid sparing pain infusion. Patient reports 35% reduction in pain after last infusion that lasted 1 week.    Purpose of pain infusion meds explained along with potential side effects.  Patient verbalized understanding.    B: Pain Issues. Is Patient breast feeding: no      Is Patient receiving any other form Ketamine from any other facility/ outside clinic ?: no  A: Patient currently has pain described on flow sheet documentation. Designated  is Brother. Patient last ate solid food 12 hours ago, and had liquid 4 hours ago.    R: Plan; Obtain IV access, do patient risk assessment, and start opioid sparing infusion as ordered. Monitoring for S/S of adverse reactions.

## 2025-08-06 ENCOUNTER — APPOINTMENT (OUTPATIENT)
Dept: PAIN MEDICINE | Facility: CLINIC | Age: 55
End: 2025-08-06
Payer: COMMERCIAL

## 2025-09-10 ENCOUNTER — APPOINTMENT (OUTPATIENT)
Dept: PAIN MEDICINE | Facility: CLINIC | Age: 55
End: 2025-09-10
Payer: COMMERCIAL